# Patient Record
Sex: FEMALE | Race: WHITE | NOT HISPANIC OR LATINO | Employment: FULL TIME
[De-identification: names, ages, dates, MRNs, and addresses within clinical notes are randomized per-mention and may not be internally consistent; named-entity substitution may affect disease eponyms.]

---

## 2017-05-26 ENCOUNTER — HEALTH MAINTENANCE LETTER (OUTPATIENT)
Age: 57
End: 2017-05-26

## 2017-06-02 ENCOUNTER — OFFICE VISIT (OUTPATIENT)
Dept: PEDIATRICS | Facility: CLINIC | Age: 57
End: 2017-06-02
Payer: COMMERCIAL

## 2017-06-02 VITALS
OXYGEN SATURATION: 94 % | DIASTOLIC BLOOD PRESSURE: 68 MMHG | HEIGHT: 67 IN | WEIGHT: 192.3 LBS | BODY MASS INDEX: 30.18 KG/M2 | HEART RATE: 63 BPM | TEMPERATURE: 97.4 F | SYSTOLIC BLOOD PRESSURE: 132 MMHG

## 2017-06-02 DIAGNOSIS — Z12.31 VISIT FOR SCREENING MAMMOGRAM: ICD-10-CM

## 2017-06-02 DIAGNOSIS — N18.30 CKD (CHRONIC KIDNEY DISEASE) STAGE 3, GFR 30-59 ML/MIN (H): ICD-10-CM

## 2017-06-02 DIAGNOSIS — Z12.4 SCREENING FOR CERVICAL CANCER: ICD-10-CM

## 2017-06-02 DIAGNOSIS — Z00.00 ROUTINE GENERAL MEDICAL EXAMINATION AT A HEALTH CARE FACILITY: Primary | ICD-10-CM

## 2017-06-02 DIAGNOSIS — E78.5 HYPERLIPIDEMIA LDL GOAL <130: ICD-10-CM

## 2017-06-02 DIAGNOSIS — I10 ESSENTIAL HYPERTENSION WITH GOAL BLOOD PRESSURE LESS THAN 140/90: ICD-10-CM

## 2017-06-02 DIAGNOSIS — Z23 NEED FOR VACCINATION: ICD-10-CM

## 2017-06-02 DIAGNOSIS — R73.03 PREDIABETES: ICD-10-CM

## 2017-06-02 PROCEDURE — 90471 IMMUNIZATION ADMIN: CPT | Performed by: FAMILY MEDICINE

## 2017-06-02 PROCEDURE — 90715 TDAP VACCINE 7 YRS/> IM: CPT | Performed by: FAMILY MEDICINE

## 2017-06-02 PROCEDURE — 99396 PREV VISIT EST AGE 40-64: CPT | Mod: 25 | Performed by: FAMILY MEDICINE

## 2017-06-02 PROCEDURE — 87624 HPV HI-RISK TYP POOLED RSLT: CPT | Performed by: FAMILY MEDICINE

## 2017-06-02 PROCEDURE — G0145 SCR C/V CYTO,THINLAYER,RESCR: HCPCS | Performed by: FAMILY MEDICINE

## 2017-06-02 RX ORDER — METOPROLOL SUCCINATE 100 MG/1
100 TABLET, EXTENDED RELEASE ORAL DAILY
Qty: 90 TABLET | Refills: 1 | Status: SHIPPED | OUTPATIENT
Start: 2017-06-02 | End: 2018-01-12

## 2017-06-02 RX ORDER — LISINOPRIL 40 MG/1
40 TABLET ORAL DAILY
Qty: 90 TABLET | Refills: 1 | Status: SHIPPED | OUTPATIENT
Start: 2017-06-02 | End: 2018-01-12

## 2017-06-02 ASSESSMENT — PAIN SCALES - GENERAL: PAINLEVEL: NO PAIN (0)

## 2017-06-02 NOTE — MR AVS SNAPSHOT
After Visit Summary   6/2/2017    Jannet Love    MRN: [id number]           Patient Information     Date Of Birth          1960        Visit Information        Provider Department      6/2/2017 2:40 PM Laverne Argueta MD [facility]        Today's Diagnoses     Routine general medical examination at a health care facility    -  1    Essential hypertension with goal blood pressure less than 140/90        Need for vaccination        Screening for cervical cancer        CKD (chronic kidney disease) stage 3, GFR 30-59 ml/min        Visit for screening mammogram          Care Instructions    Get the TDAP shot today  Schedule for fasting labs, mammogram, recheck in 6 months      Preventive Health Recommendations  Female Ages 50 - 64    Yearly exam: See your health care provider every year in order to  o Review health changes.   o Discuss preventive care.    o Review your medicines if your doctor has prescribed any.      Get a Pap test every three years (unless you have an abnormal result and your provider advises testing more often).    If you get Pap tests with HPV test, you only need to test every 5 years, unless you have an abnormal result.     You do not need a Pap test if your uterus was removed (hysterectomy) and you have not had cancer.    You should be tested each year for STDs (sexually transmitted diseases) if you're at risk.     Have a mammogram every 1 to 2 years.    Have a colonoscopy at age 50, or have a yearly FIT test (stool test). These exams screen for colon cancer.      Have a cholesterol test every 5 years, or more often if advised.    Have a diabetes test (fasting glucose) every three years. If you are at risk for diabetes, you should have this test more often.     If you are at risk for osteoporosis (brittle bone disease), think about having a bone density scan (DEXA).    Shots: Get a flu shot each year. Get a tetanus shot every 10 years.    Nutrition:      Eat at least 5 servings of fruits and vegetables each day.    Eat whole-grain bread, whole-wheat pasta and brown rice instead of white grains and rice.    Talk to your provider about Calcium and Vitamin D.     Lifestyle    Exercise at least 150 minutes a week (30 minutes a day, 5 days a week). This will help you control your weight and prevent disease.    Limit alcohol to one drink per day.    No smoking.     Wear sunscreen to prevent skin cancer.     See your dentist every six months for an exam and cleaning.    See your eye doctor every 1 to 2 years.            Follow-ups after your visit        Future tests that were ordered for you today     Open Future Orders        Priority Expected Expires Ordered    MA Screening Digital Bilateral Routine  6/2/2018 6/2/2017            Who to contact     If you have questions or need follow up information about today's clinic visit or your schedule please contact [facility] directly at [phone].  Normal or non-critical lab and imaging results will be communicated to you by [employer], letter or phone within 4 business days after the clinic has received the results. If you do not hear from us within 7 days, please contact the clinic through [employer] or phone. If you have a critical or abnormal lab result, we will notify you by phone as soon as possible.  Submit refill requests through [employer] or call your pharmacy and they will forward the refill request to us. Please allow 3 business days for your refill to be completed.          Additional Information About Your Visit        Envervhart Information     [employer] gives you secure access to your electronic health record. If you see a primary care provider, you can also send messages to your care team and make appointments. If you have questions, please call your primary care clinic.  If you do not have a primary care provider, please call [phone] and they will assist you.      [employer] is an electronic  "gateway that provides easy, online access to your medical records. With [employer]Dep, you can request a clinic appointment, read your test results, renew a prescription or communicate with your care team.     To access your existing account, please contact your [facility] or call [phone] for assistance.        Care EveryWhere ID     This is your Care EveryWhere ID. This could be used by other organizations to access your [place] medical records  [id number]        Your Vitals Were     Pulse Temperature Height Last Period Pulse Oximetry BMI (Body Mass Index)    63 97.4  F (36.3  C) (Oral) 5' 7\" (1.702 m) 08/26/2007 94% 30.12 kg/m2       Blood Pressure from Last 3 Encounters:   06/02/17 132/68   11/30/16 138/78   09/04/15 124/78    Weight from Last 3 Encounters:   06/02/17 192 lb 4.8 oz (87.2 kg)   11/30/16 189 lb (85.7 kg)   09/04/15 177 lb (80.3 kg)              We Performed the Following     1st  Administration  [27493]     HPV High Risk Types DNA Cervical     Pap imaged thin layer screen with HPV - recommended age 30 - 65 years (select HPV order below)     TDAP VACCINE (ADACEL) [24642.002]          Where to get your medicines      Some of these will need a paper prescription and others can be bought over the counter.  Ask your nurse if you have questions.     Bring a paper prescription for each of these medications     lisinopril 40 MG tablet    metoprolol 100 MG 24 hr tablet          Primary Care Provider Office Phone # Fax #    Laverne Argueta [phone]       [facility] [address]  [facility] [address]        Thank you!     Thank you for choosing [facility]  for your care. Our goal is always to provide you with excellent care. Hearing back from our patients is one way we can continue to improve our services. Please take a few minutes to complete the written survey that you may receive in the mail after your visit with us. " Thank you!             Your Updated Medication List - Protect others around you: Learn how to safely use, store and throw away your medicines at www.disposemymeds.org.          This list is accurate as of: 6/2/17  3:14 PM.  Always use your most recent med list.                   Brand Name Dispense Instructions for use    aspirin 81 MG tablet     100    ONE DAILY       Calcium-Vitamin D 600-200 MG-UNIT Tabs      Take 1 tablet by mouth daily       fish oil-omega-3 fatty acids 1000 MG capsule      take 2 g by mouth daily.       lisinopril 40 MG tablet    PRINIVIL/ZESTRIL    90 tablet    Take 1 tablet (40 mg) by mouth daily       metoprolol 100 MG 24 hr tablet    TOPROL-XL    90 tablet    Take 1 tablet (100 mg) by mouth daily       simvastatin 40 MG tablet    ZOCOR    90 tablet    Take 1 tablet (40 mg) by mouth At Bedtime

## 2017-06-02 NOTE — PROGRESS NOTES
SUBJECTIVE:     CC: Jannet Love is an 56 year old woman who presents for preventive health visit.     Physical   Annual:     Getting at least 3 servings of Calcium per day::  Yes    Bi-annual eye exam::  Yes    Dental care twice a year::  Yes    Sleep apnea or symptoms of sleep apnea::  None    Diet::  Low salt and Low fat/cholesterol    Frequency of exercise::  6-7 days/week    Duration of exercise::  30-45 minutes    Taking medications regularly::  Yes    Medication side effects::  None    Additional concerns today::  No        Hyperlipidemia Follow-Up      Rate your low fat/cholesterol diet?: not monitoring fat    Taking statin?  no    Other lipid medications/supplements?:  none     Hypertension Follow-up      Outpatient blood pressures are not being checked.    Low Salt Diet: no added salt       Today's PHQ-2 Score:   PHQ-2 ( 1999 Pfizer) 5/30/2017   Q1: Little interest or pleasure in doing things 0   Q2: Feeling down, depressed or hopeless 0   PHQ-2 Score 0   Q1: Little interest or pleasure in doing things Not at all   Q2: Feeling down, depressed or hopeless Not at all   PHQ-2 Score 0       Abuse: Current or Past(Physical, Sexual or Emotional)- No  Do you feel safe in your environment - Yes    Social History   Substance Use Topics     Smoking status: Never Smoker     Smokeless tobacco: Never Used     Alcohol use Yes      Comment: ocassionally     The patient does not drink >3 drinks per day nor >7 drinks per week.    Recent Labs   Lab Test  12/07/16   0716  09/04/15   0730  04/11/14   0729   CHOL  196  140  166   HDL  46*  44*  35*   LDL  133*  76  91   TRIG  85  102  203*   CHOLHDLRATIO   --   3.2  4.8   NHDL  150*   --    --        Reviewed orders with patient.  Reviewed health maintenance and updated orders accordingly - Yes    Mammo Decision Support:  Patient over age 50, mutual decision to screen reflected in health maintenance.    Pertinent mammograms are reviewed under the imaging tab.  History  of abnormal Pap smear: NO - age 30- 65 PAP every 3 years recommended    Reviewed and updated as needed this visit by clinical staff  Tobacco  Allergies  Meds  Med Hx  Surg Hx  Fam Hx  Soc Hx        Reviewed and updated as needed this visit by Provider          Past Medical History:   Diagnosis Date     Bronchitis, not specified as acute or chronic      EXCESSIVE MENSTRUATION 2007     EXCESSIVE MENSTRUATION 2007    Mirena IUD placed for simple hyperplasia     INSERTION OF IUD 10/3/2007    Mirena IUD  For simple hyperplasia of the endometrium     Mixed hyperlipidemia      NONSPECIFIC MEDICAL HISTORY     cough     Unspecified essential hypertension       Past Surgical History:   Procedure Laterality Date     C NONSPECIFIC PROCEDURE      Hysteroscopy with deep endometrial biopsy. Endocervical curettage.     C NONSPECIFIC PROCEDURE      brachial cleft cyst repair     HEAD & NECK SURGERY       Obstetric History       T2      L2     SAB0   TAB0   Ectopic0   Multiple0   Live Births0       # Outcome Date GA Lbr Scar/2nd Weight Sex Delivery Anes PTL Lv   3 Term 86 40w0d   M    RALPH      Name: Mars   2 SAB 85     SAB   DEC    Term 83 40w0d   F    RALPH      Name: Tonya          ROS:  C: NEGATIVE for fever, chills, change in weight  I: NEGATIVE for worrisome rashes, moles or lesions  E: NEGATIVE for vision changes or irritation  ENT: NEGATIVE for ear, mouth and throat problems  R: NEGATIVE for significant cough or SOB  B: NEGATIVE for masses, tenderness or discharge  CV: NEGATIVE for chest pain, palpitations or peripheral edema  CV: Hx HTN  GI: NEGATIVE for nausea, abdominal pain, heartburn, or change in bowel habits  : NEGATIVE for unusual urinary or vaginal symptoms. No vaginal bleeding.  M: NEGATIVE for significant arthralgias or myalgia  N: NEGATIVE for weakness, dizziness or paresthesias  E: NEGATIVE for temperature intolerance, skin/hair changes  H: NEGATIVE  for bleeding problems  P: NEGATIVE for changes in mood or affect     Problem list, Medication list, Allergies, and Medical/Social/Surgical histories reviewed in [facility] and updated as appropriate.  Labs reviewed in EPIC  BP Readings from Last 3 Encounters:   06/02/17 132/68   11/30/16 138/78   09/04/15 124/78    Wt Readings from Last 3 Encounters:   06/02/17 192 lb 4.8 oz (87.2 kg)   11/30/16 189 lb (85.7 kg)   09/04/15 177 lb (80.3 kg)                  Patient Active Problem List   Diagnosis     Excessive or frequent menstruation     Surveillance of previously prescribed intrauterine contraceptive device     Endometrial hyperplasia     Knee pain     Dyslipidemia     Dermatofibroma of lower extremity     Prediabetes     Hyperlipidemia LDL goal <130     CKD (chronic kidney disease) stage 3, GFR 30-59 ml/min     Essential hypertension with goal blood pressure less than 140/90     Past Surgical History:   Procedure Laterality Date     C NONSPECIFIC PROCEDURE  2000    Hysteroscopy with deep endometrial biopsy. Endocervical curettage.     C NONSPECIFIC PROCEDURE  1984    brachial cleft cyst repair     HEAD & NECK SURGERY         Social History   Substance Use Topics     Smoking status: Never Smoker     Smokeless tobacco: Never Used     Alcohol use Yes      Comment: ocassionally     Family History   Problem Relation Age of Onset     Hypertension Mother      C.A.D. Mother      MI     C.A.D. Father      MI     Cardiovascular Father 49     MI, pacemaker in 2009     HEART DISEASE Father      Prostate Cancer Father      C.A.D. Brother      MI at age 41     Cardiovascular Brother      HEART DISEASE Brother      C.A.D. Maternal Grandfather      DIABETES Maternal Grandmother      GASTROINTESTINAL DISEASE Daughter 11     Crohns Disease     Breast Cancer Paternal Grandmother      Alcohol/Drug Paternal Grandfather          Current Outpatient Prescriptions   Medication Sig Dispense Refill     metoprolol (TOPROL-XL) 100 MG 24 hr tablet  "Take 1 tablet (100 mg) by mouth daily 90 tablet 1     lisinopril (PRINIVIL/ZESTRIL) 40 MG tablet Take 1 tablet (40 mg) by mouth daily 90 tablet 1     Calcium-Vitamin D 600-200 MG-UNIT TABS Take 1 tablet by mouth daily        fish oil-omega-3 fatty acids (FISH OIL) 1000 MG capsule take 2 g by mouth daily.       [DISCONTINUED] metoprolol (TOPROL-XL) 100 MG 24 hr tablet Take 1 tablet (100 mg) by mouth daily 90 tablet 1     [DISCONTINUED] lisinopril (PRINIVIL,ZESTRIL) 40 MG tablet Take 1 tablet (40 mg) by mouth daily 90 tablet 1     simvastatin (ZOCOR) 40 MG tablet Take 1 tablet (40 mg) by mouth At Bedtime (Patient not taking: Reported on 6/2/2017) 90 tablet 1     ASPIRIN 81 MG OR TABS ONE DAILY 100 3     Allergies   Allergen Reactions     No Known Drug Allergies      Recent Labs   Lab Test  12/07/16   0716  12/07/16   0710  09/04/15   0730  10/24/14   0723  04/11/14   0729   A1C  5.6   --   5.6  5.9  6.1*   LDL  133*   --   76   --   91   HDL  46*   --   44*   --   35*   TRIG  85   --   102   --   203*   ALT  26   --   31   --   43   CR  0.92   --   1.01   --    --    GFRESTIMATED  63   --   57*   --    --    GFRESTBLACK  76   --   69   --    --    POTASSIUM  4.4   --   4.1   --    --    TSH   --   2.08   --    --    --       OBJECTIVE:     /68  Pulse 63  Temp 97.4  F (36.3  C) (Oral)  Ht 5' 7\" (1.702 m)  Wt 192 lb 4.8 oz (87.2 kg)  LMP 08/26/2007  SpO2 94%  BMI 30.12 kg/m2  EXAM:  GENERAL APPEARANCE: healthy, alert and no distress  EYES: Eyes grossly normal to inspection, PERRL and conjunctivae and sclerae normal  HENT: ear canals and TM's normal, nose and mouth without ulcers or lesions, oropharynx clear and oral mucous membranes moist  NECK: no adenopathy, no asymmetry, masses, or scars and thyroid normal to palpation  RESP: lungs clear to auscultation - no rales, rhonchi or wheezes  BREAST: normal without masses, tenderness or nipple discharge and no palpable axillary masses or adenopathy  CV: regular " rate and rhythm, normal S1 S2, no S3 or S4, no murmur, click or rub, no peripheral edema and peripheral pulses strong  ABDOMEN: soft, nontender, no hepatosplenomegaly, no masses and bowel sounds normal   (female): normal female external genitalia, normal urethral meatus, vaginal mucosal atrophy noted, normal cervix, adnexae, and uterus without masses or abnormal discharge  MS: no musculoskeletal defects are noted and gait is age appropriate without ataxia  SKIN: no suspicious lesions or rashes  NEURO: Normal strength and tone, sensory exam grossly normal, mentation intact and speech normal  PSYCH: mentation appears normal and affect normal/bright    ASSESSMENT/PLAN:     1. Routine general medical examination at a health care facility  Discussed on regular exercises, daily calcium intake, healthy eating, self breast exams monthly and routine dental checks  - Pap imaged thin layer screen with HPV - recommended age 30 - 65 years (select HPV order below)  - HPV High Risk Types DNA Cervical  - MA Screening Digital Bilateral; Future    2. Essential hypertension with goal blood pressure less than 140/90  Blood pressure is at goal, continue with current medications, recheck in 6 months along with fasting labs  Will follow low salt diet, weight loss and regular exercises.    - metoprolol (TOPROL-XL) 100 MG 24 hr tablet; Take 1 tablet (100 mg) by mouth daily  Dispense: 90 tablet; Refill: 1  - lisinopril (PRINIVIL/ZESTRIL) 40 MG tablet; Take 1 tablet (40 mg) by mouth daily  Dispense: 90 tablet; Refill: 1    3. Need for vaccination    - TDAP VACCINE (ADACEL) [15296.002]  - 1st  Administration  [83136]    4. Screening for cervical cancer    - Pap imaged thin layer screen with HPV - recommended age 30 - 65 years (select HPV order below)  - HPV High Risk Types DNA Cervical    5. CKD (chronic kidney disease) stage 3, GFR 30-59 ml/min  Continue to monitor, avoid over-the-counter NSAIDs, recheck in 6 months    6. Visit for  "screening mammogram    - MA Screening Digital Bilateral; Future    7. Hyperlipidemia LDL goal <130  LDL Cholesterol Calculated   Date Value Ref Range Status   12/07/2016 133 (H) <100 mg/dL Final     Comment:     Above desirable:  100-129 mg/dl   Borderline High:  130-159 mg/dL   High:             160-189 mg/dL   Very high:       >189 mg/dl     ]  Patient is currently off of statin, will recheck at the time of visit in 6 months,  Continue with healthy eating, regular exercise, start on weight loss    8. Prediabetes  as above      COUNSELING:  Reviewed preventive health counseling, as reflected in patient instructions  Special attention given to:        Regular exercise       Healthy diet/nutrition       Vision screening       Immunizations    Vaccinated for: TDAP           Osteoporosis Prevention/Bone Health       Colon cancer screening       Consider Hep C screening for patients born between 1945 and 1965       (Caroline)menopause management       The 10-year ASCVD risk score (Gloria ANGELES Jr, et al., 2013) is: 3.9%    Values used to calculate the score:      Age: 56 years      Sex: Female      Is Non- : No      Diabetic: No      Tobacco smoker: No      Systolic Blood Pressure: 132 mmHg      Is BP treated: Yes      HDL Cholesterol: 46 mg/dL      Total Cholesterol: 196 mg/dL         reports that she has never smoked. She has never used smokeless tobacco.    Estimated body mass index is 30.12 kg/(m^2) as calculated from the following:    Height as of this encounter: 5' 7\" (1.702 m).    Weight as of this encounter: 192 lb 4.8 oz (87.2 kg).   Weight management plan: Discussed healthy diet and exercise guidelines and patient will follow up in 6 months in clinic to re-evaluate.    Counseling Resources:  ATP IV Guidelines  Pooled Cohorts Equation Calculator  Breast Cancer Risk Calculator  FRAX Risk Assessment  ICSI Preventive Guidelines  Dietary Guidelines for Americans, 2010  USDA's MyPlate  ASA " Prophylaxis  Lung CA Screening    Laverne Argueta MD  [facility]  Answers for HPI/ROS submitted by the patient on 5/30/2017   PHQ-2 Score: 0  Chart documentation done in part with Dragon Voice recognition Software. Although reviewed after completion, some word and grammatical error may remain.

## 2017-06-02 NOTE — PATIENT INSTRUCTIONS
Get the TDAP shot today  Schedule for fasting labs, mammogram, recheck in 6 months      Preventive Health Recommendations  Female Ages 50 - 64    Yearly exam: See your health care provider every year in order to  o Review health changes.   o Discuss preventive care.    o Review your medicines if your doctor has prescribed any.      Get a Pap test every three years (unless you have an abnormal result and your provider advises testing more often).    If you get Pap tests with HPV test, you only need to test every 5 years, unless you have an abnormal result.     You do not need a Pap test if your uterus was removed (hysterectomy) and you have not had cancer.    You should be tested each year for STDs (sexually transmitted diseases) if you're at risk.     Have a mammogram every 1 to 2 years.    Have a colonoscopy at age 50, or have a yearly FIT test (stool test). These exams screen for colon cancer.      Have a cholesterol test every 5 years, or more often if advised.    Have a diabetes test (fasting glucose) every three years. If you are at risk for diabetes, you should have this test more often.     If you are at risk for osteoporosis (brittle bone disease), think about having a bone density scan (DEXA).    Shots: Get a flu shot each year. Get a tetanus shot every 10 years.    Nutrition:     Eat at least 5 servings of fruits and vegetables each day.    Eat whole-grain bread, whole-wheat pasta and brown rice instead of white grains and rice.    Talk to your provider about Calcium and Vitamin D.     Lifestyle    Exercise at least 150 minutes a week (30 minutes a day, 5 days a week). This will help you control your weight and prevent disease.    Limit alcohol to one drink per day.    No smoking.     Wear sunscreen to prevent skin cancer.     See your dentist every six months for an exam and cleaning.    See your eye doctor every 1 to 2 years.

## 2017-06-02 NOTE — NURSING NOTE
"Chief Complaint   Patient presents with     Physical       Initial /68  Pulse 63  Temp 97.4  F (36.3  C) (Oral)  Ht 5' 7\" (1.702 m)  Wt 192 lb 4.8 oz (87.2 kg)  LMP 08/26/2007  SpO2 94%  BMI 30.12 kg/m2 Estimated body mass index is 30.12 kg/(m^2) as calculated from the following:    Height as of this encounter: 5' 7\" (1.702 m).    Weight as of this encounter: 192 lb 4.8 oz (87.2 kg).  BP completed using cuff size: ashwini Lopez, CMA    "

## 2017-06-06 LAB
COPATH REPORT: NORMAL
PAP: NORMAL

## 2017-06-08 LAB
FINAL DIAGNOSIS: NORMAL
HPV HR 12 DNA CVX QL NAA+PROBE: NEGATIVE
HPV16 DNA SPEC QL NAA+PROBE: NEGATIVE
HPV18 DNA SPEC QL NAA+PROBE: NEGATIVE
SPECIMEN DESCRIPTION: NORMAL

## 2017-12-22 ENCOUNTER — DOCUMENTATION ONLY (OUTPATIENT)
Dept: LAB | Facility: CLINIC | Age: 57
End: 2017-12-22

## 2017-12-22 DIAGNOSIS — E78.5 HYPERLIPIDEMIA LDL GOAL <130: Primary | ICD-10-CM

## 2017-12-22 DIAGNOSIS — I10 ESSENTIAL HYPERTENSION WITH GOAL BLOOD PRESSURE LESS THAN 140/90: ICD-10-CM

## 2017-12-22 NOTE — PROGRESS NOTES
Labs pended. Routed to PCP to review and order.    Marjan Luong RN,   [facility]

## 2017-12-27 ENCOUNTER — RADIANT APPOINTMENT (OUTPATIENT)
Dept: MAMMOGRAPHY | Facility: CLINIC | Age: 57
End: 2017-12-27
Attending: FAMILY MEDICINE
Payer: COMMERCIAL

## 2017-12-27 DIAGNOSIS — I10 ESSENTIAL HYPERTENSION WITH GOAL BLOOD PRESSURE LESS THAN 140/90: ICD-10-CM

## 2017-12-27 DIAGNOSIS — E78.5 HYPERLIPIDEMIA LDL GOAL <130: ICD-10-CM

## 2017-12-27 DIAGNOSIS — Z12.31 VISIT FOR SCREENING MAMMOGRAM: ICD-10-CM

## 2017-12-27 DIAGNOSIS — Z00.00 ROUTINE GENERAL MEDICAL EXAMINATION AT A HEALTH CARE FACILITY: ICD-10-CM

## 2017-12-27 LAB
ANION GAP SERPL CALCULATED.3IONS-SCNC: 6 MMOL/L (ref 3–14)
BUN SERPL-MCNC: 17 MG/DL (ref 7–30)
CALCIUM SERPL-MCNC: 8.9 MG/DL (ref 8.5–10.1)
CHLORIDE SERPL-SCNC: 106 MMOL/L (ref 94–109)
CHOLEST SERPL-MCNC: 257 MG/DL
CO2 SERPL-SCNC: 27 MMOL/L (ref 20–32)
CREAT SERPL-MCNC: 0.81 MG/DL (ref 0.52–1.04)
CREAT UR-MCNC: 102 MG/DL
GFR SERPL CREATININE-BSD FRML MDRD: 72 ML/MIN/1.7M2
GLUCOSE SERPL-MCNC: 103 MG/DL (ref 70–99)
HDLC SERPL-MCNC: 47 MG/DL
LDLC SERPL CALC-MCNC: 164 MG/DL
MICROALBUMIN UR-MCNC: 72 MG/L
MICROALBUMIN/CREAT UR: 70.98 MG/G CR (ref 0–25)
NONHDLC SERPL-MCNC: 210 MG/DL
POTASSIUM SERPL-SCNC: 4 MMOL/L (ref 3.4–5.3)
SODIUM SERPL-SCNC: 139 MMOL/L (ref 133–144)
TRIGL SERPL-MCNC: 231 MG/DL

## 2017-12-27 PROCEDURE — 82043 UR ALBUMIN QUANTITATIVE: CPT | Performed by: FAMILY MEDICINE

## 2017-12-27 PROCEDURE — 80061 LIPID PANEL: CPT | Performed by: FAMILY MEDICINE

## 2017-12-27 PROCEDURE — 80048 BASIC METABOLIC PNL TOTAL CA: CPT | Performed by: FAMILY MEDICINE

## 2017-12-27 PROCEDURE — G0202 SCR MAMMO BI INCL CAD: HCPCS | Performed by: STUDENT IN AN ORGANIZED HEALTH CARE EDUCATION/TRAINING PROGRAM

## 2017-12-27 PROCEDURE — 36415 COLL VENOUS BLD VENIPUNCTURE: CPT | Performed by: FAMILY MEDICINE

## 2017-12-30 NOTE — PROGRESS NOTES
Dear Jannet,   Dr. Argueta is out of the office. I'm review your results on her behalf.     --Lipid panel is much higher than a year ago.  Glucose is in the prediabetes range.  Urine now has protein.  --Please review these results with Dr. Argueta at your appointment on January 12, 2018    Please call or Mychart to our office if you have further questions.     Ze Constantino MD-PhD

## 2018-01-12 ENCOUNTER — OFFICE VISIT (OUTPATIENT)
Dept: PEDIATRICS | Facility: CLINIC | Age: 58
End: 2018-01-12
Payer: COMMERCIAL

## 2018-01-12 VITALS
DIASTOLIC BLOOD PRESSURE: 86 MMHG | OXYGEN SATURATION: 98 % | SYSTOLIC BLOOD PRESSURE: 186 MMHG | TEMPERATURE: 98.3 F | HEIGHT: 67 IN | WEIGHT: 203.3 LBS | BODY MASS INDEX: 31.91 KG/M2 | HEART RATE: 92 BPM

## 2018-01-12 DIAGNOSIS — E66.9 OBESITY (BMI 30-39.9): ICD-10-CM

## 2018-01-12 DIAGNOSIS — R73.03 PREDIABETES: ICD-10-CM

## 2018-01-12 DIAGNOSIS — I10 ESSENTIAL HYPERTENSION WITH GOAL BLOOD PRESSURE LESS THAN 140/90: Primary | ICD-10-CM

## 2018-01-12 DIAGNOSIS — E78.5 HYPERLIPIDEMIA LDL GOAL <100: ICD-10-CM

## 2018-01-12 PROCEDURE — 99214 OFFICE O/P EST MOD 30 MIN: CPT | Performed by: FAMILY MEDICINE

## 2018-01-12 RX ORDER — SIMVASTATIN 40 MG
40 TABLET ORAL AT BEDTIME
Qty: 90 TABLET | Refills: 2 | Status: SHIPPED | OUTPATIENT
Start: 2018-01-12 | End: 2018-05-11

## 2018-01-12 RX ORDER — METOPROLOL SUCCINATE 100 MG/1
100 TABLET, EXTENDED RELEASE ORAL DAILY
Qty: 90 TABLET | Refills: 2 | Status: SHIPPED | OUTPATIENT
Start: 2018-01-12 | End: 2018-09-14

## 2018-01-12 RX ORDER — LISINOPRIL 40 MG/1
40 TABLET ORAL DAILY
Qty: 90 TABLET | Refills: 2 | Status: SHIPPED | OUTPATIENT
Start: 2018-01-12 | End: 2018-09-14

## 2018-01-12 NOTE — NURSING NOTE
"Chief Complaint   Patient presents with     Recheck Medication       Initial /86  Pulse 92  Temp 98.3  F (36.8  C) (Temporal)  Ht 5' 7.25\" (1.708 m)  Wt 203 lb 4.8 oz (92.2 kg)  LMP 08/26/2007  SpO2 98%  BMI 31.6 kg/m2 Estimated body mass index is 31.6 kg/(m^2) as calculated from the following:    Height as of this encounter: 5' 7.25\" (1.708 m).    Weight as of this encounter: 203 lb 4.8 oz (92.2 kg).  Medication Reconciliation: complete     Radha Birmingham CMA  "

## 2018-01-12 NOTE — MR AVS SNAPSHOT
After Visit Summary   1/12/2018    Jannet Love    MRN: [id number]           Patient Information     Date Of Birth          1960        Visit Information        Provider Department      1/12/2018 11:10 AM Laverne Argueta MD [facility]        Today's Diagnoses     Hyperlipidemia LDL goal <100        Essential hypertension with goal blood pressure less than 140/90          Care Instructions    Schedule for fasting labs and physical in 6/2018  Please make sure to take medications daily          Follow-ups after your visit        Your next 10 appointments already scheduled     Jun 08, 2018  8:00 AM CDT   LAB with LAB FIRST FLOOR [facility] ([facility])    [address] [place] [id number] [phone]           Please do not eat 10-12 hours before your appointment if you are coming in fasting for labs on lipids, cholesterol, or glucose (sugar). This does not apply to pregnant women. Water, hot tea and black coffee (with nothing added) are okay. Do not drink other fluids, diet soda or chew gum.            Jun 08, 2018  1:30 PM CDT   PHYSICAL with Laverne Argueta MD   [facility] ([facility])    [address] [place] [id number] [phone]              Who to contact     If you have questions or need follow up information about today's clinic visit or your schedule please contact [facility] directly at [phone].  Normal or non-critical lab and imaging results will be communicated to you by MyChart, letter or phone within 4 business days after the clinic has received the results. If you do not hear from us within 7 days, please contact the clinic through MyChart or phone. If you have a critical or abnormal lab result, we will notify you by phone as soon as possible.  Submit refill requests through [employer] or call your pharmacy and they will  "forward the refill request to us. Please allow 3 business days for your refill to be completed.          Additional Information About Your Visit        [employer] Information     [employer] gives you secure access to your electronic health record. If you see a primary care provider, you can also send messages to your care team and make appointments. If you have questions, please call your primary care clinic.  If you do not have a primary care provider, please call [phone] and they will assist you.      [employer] is an electronic gateway that provides easy, online access to your medical records. With [employer], you can request a clinic appointment, read your test results, renew a prescription or communicate with your care team.     To access your existing account, please contact your [facility] or call [phone] for assistance.        Care EveryWhere ID     This is your Care EveryWhere ID. This could be used by other organizations to access your [facility] medical records  [id number]        Your Vitals Were     Pulse Temperature Height Last Period Pulse Oximetry BMI (Body Mass Index)    92 98.3  F (36.8  C) (Temporal) 5' 7.25\" (1.708 m) 08/26/2007 98% 31.6 kg/m2       Blood Pressure from Last 3 Encounters:   01/12/18 186/86   06/02/17 132/68   11/30/16 138/78    Weight from Last 3 Encounters:   01/12/18 203 lb 4.8 oz (92.2 kg)   06/02/17 192 lb 4.8 oz (87.2 kg)   11/30/16 189 lb (85.7 kg)              Today, you had the following     No orders found for display         Where to get your medicines      These medications were sent to [facility] - [address], [facility] [address]     Phone:  [phone]     lisinopril 40 MG tablet    metoprolol succinate 100 MG 24 hr tablet    simvastatin 40 MG tablet          Primary Care Provider Office Phone # Fax #    Laverne Argueta [phone] [id number] [place]  [facility] [address]        Equal Access to Services     RAFAELA MYERS : Hadii tasneem cruz haddaylinwing Sodivyaali, waaxda luqadaha, qaybta kaareliantonio melo, ingrid bynumbensonstone klein . So [facility] [phone].    ATENCIÓN: Si habla español, tiene a herrera disposición servicios gratuitos de asistencia lingüística. Llame al [phone].    We comply with applicable federal civil rights laws and [place] laws. We do not discriminate on the basis of race, color, national origin, age, disability, sex, sexual orientation, or gender identity.            Thank you!     Thank you for choosing [facility]  for your care. Our goal is always to provide you with excellent care. Hearing back from our patients is one way we can continue to improve our services. Please take a few minutes to complete the written survey that you may receive in the mail after your visit with us. Thank you!             Your Updated Medication List - Protect others around you: Learn how to safely use, store and throw away your medicines at www.disposemymeds.org.          This list is accurate as of: 1/12/18 11:54 AM.  Always use your most recent med list.                   Brand Name Dispense Instructions for use Diagnosis    aspirin 81 MG tablet     100    ONE DAILY        Calcium-Vitamin D 600-200 MG-UNIT Tabs      Take 1 tablet by mouth daily        fish oil-omega-3 fatty acids 1000 MG capsule      take 2 g by mouth daily.        lisinopril 40 MG tablet    PRINIVIL/ZESTRIL    90 tablet    Take 1 tablet (40 mg) by mouth daily    Essential hypertension with goal blood pressure less than 140/90       metoprolol succinate 100 MG 24 hr tablet    TOPROL-XL    90 tablet    Take 1 tablet (100 mg) by mouth daily    Essential hypertension with goal blood pressure less than 140/90       simvastatin 40 MG tablet    ZOCOR    90 tablet    Take 1 tablet (40 mg) by mouth At Bedtime    Hyperlipidemia LDL goal <100

## 2018-01-12 NOTE — PROGRESS NOTES
SUBJECTIVE:   Jannet Love is a 57 year old female who presents to clinic today for the following health issues:      Hyperlipidemia Follow-Up      Rate your low fat/cholesterol diet?: fair    Taking statin?  Stopped due to insurance/Moving to [place]    Other lipid medications/supplements?:  none    Hypertension Follow-up      Outpatient blood pressures are not being checked.    Low Salt Diet: no added salt          Amount of exercise or physical activity: 4-5 days/week for an average of 30-45 minutes    Problems taking medications regularly: Patient not taking medications since 08/2017 due to insurance and move to [place].    Medication side effects: not applicable  Diet: regular (no restrictions)     PREDIABETES- Deneis polyuria, polydipsia, and polyphagia.          Problem list and histories reviewed & adjusted, as indicated.  Additional history: as documented    Patient Active Problem List   Diagnosis     Excessive or frequent menstruation     Surveillance of previously prescribed intrauterine contraceptive device     Endometrial hyperplasia     Knee pain     Dyslipidemia     Dermatofibroma of lower extremity     Prediabetes     Hyperlipidemia LDL goal <130     CKD (chronic kidney disease) stage 3, GFR 30-59 ml/min     Essential hypertension with goal blood pressure less than 140/90     Obesity (BMI 30-39.9)     Past Surgical History:   Procedure Laterality Date     C NONSPECIFIC PROCEDURE  2000    Hysteroscopy with deep endometrial biopsy. Endocervical curettage.     C NONSPECIFIC PROCEDURE  1984    brachial cleft cyst repair     HEAD & NECK SURGERY         Social History   Substance Use Topics     Smoking status: Never Smoker     Smokeless tobacco: Never Used     Alcohol use Yes      Comment: ocassionally     Family History   Problem Relation Age of Onset     Hypertension Mother      C.A.D. Mother      MI     C.A.D. Father      MI     Cardiovascular Father 49     MI, pacemaker in 2009     HEART DISEASE  Father      Prostate Cancer Father      HOMEAFLORI. Brother      MI at age 41     Cardiovascular Brother      HEART DISEASE Brother      ANAIS.A.JRESON. Maternal Grandfather      DIABETES Maternal Grandmother      GASTROINTESTINAL DISEASE Daughter 11     Crohns Disease     Breast Cancer Paternal Grandmother      Alcohol/Drug Paternal Grandfather          Current Outpatient Prescriptions   Medication Sig Dispense Refill     simvastatin (ZOCOR) 40 MG tablet Take 1 tablet (40 mg) by mouth At Bedtime 90 tablet 2     lisinopril (PRINIVIL/ZESTRIL) 40 MG tablet Take 1 tablet (40 mg) by mouth daily 90 tablet 2     metoprolol succinate (TOPROL-XL) 100 MG 24 hr tablet Take 1 tablet (100 mg) by mouth daily 90 tablet 2     [DISCONTINUED] metoprolol (TOPROL-XL) 100 MG 24 hr tablet Take 1 tablet (100 mg) by mouth daily (Patient not taking: Reported on 1/12/2018) 90 tablet 1     [DISCONTINUED] lisinopril (PRINIVIL/ZESTRIL) 40 MG tablet Take 1 tablet (40 mg) by mouth daily (Patient not taking: Reported on 1/12/2018) 90 tablet 1     [DISCONTINUED] simvastatin (ZOCOR) 40 MG tablet Take 1 tablet (40 mg) by mouth At Bedtime (Patient not taking: Reported on 6/2/2017) 90 tablet 1     Calcium-Vitamin D 600-200 MG-UNIT TABS Take 1 tablet by mouth daily        fish oil-omega-3 fatty acids (FISH OIL) 1000 MG capsule take 2 g by mouth daily.       ASPIRIN 81 MG OR TABS ONE DAILY 100 3     Allergies   Allergen Reactions     No Known Drug Allergies      Recent Labs   Lab Test  12/27/17   0739  12/07/16   0716  12/07/16   0710  09/04/15   0730  10/24/14   0723  04/11/14   0729   A1C   --   5.6   --   5.6  5.9  6.1*   LDL  164*  133*   --   76   --   91   HDL  47*  46*   --   44*   --   35*   TRIG  231*  85   --   102   --   203*   ALT   --   26   --   31   --   43   CR  0.81  0.92   --   1.01   --    --    GFRESTIMATED  72  63   --   57*   --    --    GFRESTBLACK  87  76   --   69   --    --    POTASSIUM  4.0  4.4   --   4.1   --    --    TSH   --    --    "2.08   --    --    --       BP Readings from Last 3 Encounters:   01/12/18 186/86   06/02/17 132/68   11/30/16 138/78    Wt Readings from Last 3 Encounters:   01/12/18 203 lb 4.8 oz (92.2 kg)   06/02/17 192 lb 4.8 oz (87.2 kg)   11/30/16 189 lb (85.7 kg)                  Labs reviewed in EPIC          Reviewed and updated as needed this visit by clinical staffTobacco  Allergies  Med Hx  Surg Hx  Fam Hx  Soc Hx      Reviewed and updated as needed this visit by Provider         ROS:  C: NEGATIVE for fever, chills, change in weight  INTEGUMENTARY/SKIN: NEGATIVE for worrisome rashes, moles or lesions  EYES: NEGATIVE for vision changes or irritation  R: NEGATIVE for significant cough or SOB  CV: NEGATIVE for chest pain, palpitations or peripheral edema  CV: Hx HTN  GI: NEGATIVE for nausea, abdominal pain, heartburn, or change in bowel habits  MUSCULOSKELETAL: NEGATIVE for significant arthralgias or myalgia  NEURO: NEGATIVE for weakness, dizziness or paresthesias  ENDOCRINE: NEGATIVE for temperature intolerance, skin/hair changes  HEME/ALLERGY/IMMUNE: NEGATIVE for bleeding problems  PSYCHIATRIC: NEGATIVE for changes in mood or affect    OBJECTIVE:     /86  Pulse 92  Temp 98.3  F (36.8  C) (Temporal)  Ht 5' 7.25\" (1.708 m)  Wt 203 lb 4.8 oz (92.2 kg)  LMP 08/26/2007  SpO2 98%  BMI 31.6 kg/m2  Body mass index is 31.6 kg/(m^2).  GENERAL: healthy, alert and no distress  NECK: no adenopathy, no asymmetry, masses, or scars and thyroid normal to palpation  RESP: lungs clear to auscultation - no rales, rhonchi or wheezes  CV: regular rate and rhythm, normal S1 S2, no S3 or S4, no murmur, click or rub, no peripheral edema and peripheral pulses strong  MS: no gross musculoskeletal defects noted, no edema  NEURO: Normal strength and tone, mentation intact and speech normal  PSYCH: mentation appears normal, affect normal/bright    Diagnostic Test Results:  none     ASSESSMENT/PLAN:     Hyperlipidemia; slightly " "worsened   Plan:  As mentioned below.    Hypertension; much worse   Associated with the following complications:    None   Plan:  As mentioned below.          BMI:   Estimated body mass index is 31.6 kg/(m^2) as calculated from the following:    Height as of this encounter: 5' 7.25\" (1.708 m).    Weight as of this encounter: 203 lb 4.8 oz (92.2 kg).   Weight management plan: Discussed healthy diet and exercise guidelines and patient will follow up in 6 months in clinic to re-evaluate.        1. Essential hypertension with goal blood pressure less than 140/90  BP Readings from Last 6 Encounters:   01/12/18 186/86   06/02/17 132/68   11/30/16 138/78   09/04/15 124/78   03/13/15 127/77   10/28/14 156/81       Blood pressure is not at goal.  Patient is off of both the medications for the past 2-3 months, forgetting to take since she is too busy taking care her granddaughter.  Emphasized on medication compliance for better blood pressure control and to avoid complications from uncontrolled hypertension.    Recommended to start back on lisinopril and metoprolol as before, have a blood pressure recheck with the staff in 2-3 weeks.  Patient [facility] to the [facility] only on Saturdays, wants to have blood pressure recheck herself at home   she will contact provider through my chart in 2-3 weeks on home blood pressure numbers.  Will follow low salt diet, weight loss and regular exercises.  If blood pressure is at goal, will follow-up for physical in June 2018.  Patient verbalised understanding and is agreeable to the plan.  Reviewed normal BMP lab results except for elevated fasting blood sugar.        - lisinopril (PRINIVIL/ZESTRIL) 40 MG tablet; Take 1 tablet (40 mg) by mouth daily  Dispense: 90 tablet; Refill: 2  - metoprolol succinate (TOPROL-XL) 100 MG 24 hr tablet; Take 1 tablet (100 mg) by mouth daily  Dispense: 90 tablet; Refill: 2    2. Hyperlipidemia LDL goal <100  Lab Results   Component Value Date    CHOL 257 12/27/2017 "     Lab Results   Component Value Date    HDL 47 12/27/2017     Lab Results   Component Value Date     12/27/2017     Lab Results   Component Value Date    TRIG 231 12/27/2017     Lab Results   Component Value Date    CHOLHDLRATIO 3.2 09/04/2015       Reviewed moderately elevated fasting lipid numbers, patient is off of the statin   recommended to restart statin 40 mg daily,   recheck lipids at her physical visit in 5 months..  Reviewed healthy eating and regular exercises.    - simvastatin (ZOCOR) 40 MG tablet; Take 1 tablet (40 mg) by mouth At Bedtime  Dispense: 90 tablet; Refill: 2    3. Obesity (BMI 30-39.9)  Emphasized on weight loss, portion control, low calorie and low fat diet, healthy eating, regular exercises.      4. Prediabetes  Glucose   Date Value Ref Range Status   12/27/2017 103 (H) 70 - 99 mg/dL Final     Comment:     Fasting specimen   ]    Emphasized on weight loss, healthy eating, regular exercises, recheck labs at her physical visit in June.       Work on weight loss  Regular exercise  Chart documentation done in part with Dragon Voice recognition Software. Although reviewed after completion, some word and grammatical error may remain.    See Patient Instructions    Laverne Argueta MD  [facility]

## 2018-04-20 ENCOUNTER — MYC MEDICAL ADVICE (OUTPATIENT)
Dept: PEDIATRICS | Facility: CLINIC | Age: 58
End: 2018-04-20

## 2018-04-20 DIAGNOSIS — E78.5 HYPERLIPIDEMIA LDL GOAL <130: ICD-10-CM

## 2018-04-20 DIAGNOSIS — I10 ESSENTIAL HYPERTENSION WITH GOAL BLOOD PRESSURE LESS THAN 140/90: Primary | ICD-10-CM

## 2018-04-20 DIAGNOSIS — N18.30 CKD (CHRONIC KIDNEY DISEASE) STAGE 3, GFR 30-59 ML/MIN (H): ICD-10-CM

## 2018-04-20 DIAGNOSIS — E78.5 DYSLIPIDEMIA: ICD-10-CM

## 2018-04-20 RX ORDER — HYDROCHLOROTHIAZIDE 25 MG/1
25 TABLET ORAL DAILY
Qty: 90 TABLET | Refills: 0 | Status: SHIPPED | OUTPATIENT
Start: 2018-04-20 | End: 2018-05-11 | Stop reason: SINTOL

## 2018-04-20 NOTE — TELEPHONE ENCOUNTER
Patient sent message back via [facility], she will start the medication on 4/30/18 and recheck on 5/11/18    Medication teed up for 90 days since it is a mail order pharmacy.    Routing to provider to review message and medication.      Jolly Mcdonald RN, [facility]

## 2018-05-11 ENCOUNTER — OFFICE VISIT (OUTPATIENT)
Dept: PEDIATRICS | Facility: CLINIC | Age: 58
End: 2018-05-11
Payer: COMMERCIAL

## 2018-05-11 VITALS
WEIGHT: 191 LBS | OXYGEN SATURATION: 100 % | DIASTOLIC BLOOD PRESSURE: 82 MMHG | HEART RATE: 61 BPM | SYSTOLIC BLOOD PRESSURE: 124 MMHG | TEMPERATURE: 96.8 F | BODY MASS INDEX: 29.69 KG/M2

## 2018-05-11 DIAGNOSIS — E78.5 DYSLIPIDEMIA: ICD-10-CM

## 2018-05-11 DIAGNOSIS — N18.30 BENIGN HYPERTENSION WITH CKD (CHRONIC KIDNEY DISEASE) STAGE III (H): Primary | ICD-10-CM

## 2018-05-11 DIAGNOSIS — I10 ESSENTIAL HYPERTENSION WITH GOAL BLOOD PRESSURE LESS THAN 140/90: ICD-10-CM

## 2018-05-11 DIAGNOSIS — N18.30 CKD (CHRONIC KIDNEY DISEASE) STAGE 3, GFR 30-59 ML/MIN (H): ICD-10-CM

## 2018-05-11 DIAGNOSIS — E78.5 HYPERLIPIDEMIA LDL GOAL <130: ICD-10-CM

## 2018-05-11 DIAGNOSIS — I12.9 BENIGN HYPERTENSION WITH CKD (CHRONIC KIDNEY DISEASE) STAGE III (H): Primary | ICD-10-CM

## 2018-05-11 LAB
ANION GAP SERPL CALCULATED.3IONS-SCNC: 6 MMOL/L (ref 3–14)
BUN SERPL-MCNC: 42 MG/DL (ref 7–30)
CALCIUM SERPL-MCNC: 9.4 MG/DL (ref 8.5–10.1)
CHLORIDE SERPL-SCNC: 105 MMOL/L (ref 94–109)
CHOLEST SERPL-MCNC: 140 MG/DL
CO2 SERPL-SCNC: 29 MMOL/L (ref 20–32)
CREAT SERPL-MCNC: 1.27 MG/DL (ref 0.52–1.04)
GFR SERPL CREATININE-BSD FRML MDRD: 43 ML/MIN/1.7M2
GLUCOSE SERPL-MCNC: 111 MG/DL (ref 70–99)
HDLC SERPL-MCNC: 45 MG/DL
LDLC SERPL CALC-MCNC: 76 MG/DL
NONHDLC SERPL-MCNC: 95 MG/DL
POTASSIUM SERPL-SCNC: 4.2 MMOL/L (ref 3.4–5.3)
SODIUM SERPL-SCNC: 140 MMOL/L (ref 133–144)
TRIGL SERPL-MCNC: 97 MG/DL

## 2018-05-11 PROCEDURE — 80048 BASIC METABOLIC PNL TOTAL CA: CPT | Performed by: FAMILY MEDICINE

## 2018-05-11 PROCEDURE — 36415 COLL VENOUS BLD VENIPUNCTURE: CPT | Performed by: FAMILY MEDICINE

## 2018-05-11 PROCEDURE — 99214 OFFICE O/P EST MOD 30 MIN: CPT | Performed by: FAMILY MEDICINE

## 2018-05-11 PROCEDURE — 80061 LIPID PANEL: CPT | Performed by: FAMILY MEDICINE

## 2018-05-11 RX ORDER — AMLODIPINE BESYLATE 10 MG/1
10 TABLET ORAL DAILY
Qty: 90 TABLET | Refills: 1 | Status: SHIPPED | OUTPATIENT
Start: 2018-05-11 | End: 2018-09-14

## 2018-05-11 RX ORDER — PRAVASTATIN SODIUM 40 MG
40 TABLET ORAL DAILY
Qty: 90 TABLET | Refills: 1 | Status: SHIPPED | OUTPATIENT
Start: 2018-05-11 | End: 2018-09-14

## 2018-05-11 NOTE — PATIENT INSTRUCTIONS
Stop HCTZ  Start on AMLODIPINE 10mg at bedtime  Stop Zocor and start on PRAVASTATIN 40 mg at bedtime.  Schedule for non fasting labs and physical in 8/2018

## 2018-05-11 NOTE — PROGRESS NOTES
SUBJECTIVE:   Jannet Love is a 57 year old female who presents to clinic today for the following health issues:      Hypertension Follow-up  Patient is here for hypertension recheck after having hydrochlorothiazide 25 mg added to her current regimen on metoprolol and lisinopril secondary to worsening blood pressure numbers.   Denies chest pain, SOB, edema legs, visual concerns, focal neurological symptoms, dizziness, syncope, palpitations.    Outpatient blood pressures are being checked at home.  Results are 170/90, 150/89.    Low Salt Diet: no added salt      Amount of exercise or physical activity: 6-7 days/week for an average of 30-45 minutes    Problems taking medications regularly: No    Medication side effects: Dry cough on and off    Diet: low salt        Hyperlipidemia Follow-Up      Rate your low fat/cholesterol diet?: good    Taking statin?  Yes, no muscle aches from statin    Other lipid medications/supplements?:  none    Chronic Kidney Disease Follow-up      Current NSAID use?  No      Problem list and histories reviewed & adjusted, as indicated.  Additional history: as documented    Patient Active Problem List   Diagnosis     Excessive or frequent menstruation     Surveillance of previously prescribed intrauterine contraceptive device     Endometrial hyperplasia     Dyslipidemia     Dermatofibroma of lower extremity     Prediabetes     Hyperlipidemia LDL goal <130     CKD (chronic kidney disease) stage 3, GFR 30-59 ml/min     Essential hypertension with goal blood pressure less than 140/90     Obesity (BMI 30-39.9)     Past Surgical History:   Procedure Laterality Date     C NONSPECIFIC PROCEDURE  2000    Hysteroscopy with deep endometrial biopsy. Endocervical curettage.     C NONSPECIFIC PROCEDURE  1984    brachial cleft cyst repair     HEAD & NECK SURGERY         Social History   Substance Use Topics     Smoking status: Never Smoker     Smokeless tobacco: Never Used     Alcohol use Yes       Comment: ocassionally     Family History   Problem Relation Age of Onset     Hypertension Mother      C.A.D. Mother      MI     C.A.D. Father      MI     Cardiovascular Father 49     MI, pacemaker in 2009     HEART DISEASE Father      Prostate Cancer Father      C.A.D. Brother      MI at age 41     Cardiovascular Brother      HEART DISEASE Brother      C.A.D. Maternal Grandfather      DIABETES Maternal Grandmother      GASTROINTESTINAL DISEASE Daughter 11     Crohns Disease     Breast Cancer Paternal Grandmother      Alcohol/Drug Paternal Grandfather          Current Outpatient Prescriptions   Medication Sig Dispense Refill     amLODIPine (NORVASC) 10 MG tablet Take 1 tablet (10 mg) by mouth daily 90 tablet 1     ASPIRIN 81 MG OR TABS ONE DAILY 100 3     Calcium-Vitamin D 600-200 MG-UNIT TABS Take 1 tablet by mouth daily        fish oil-omega-3 fatty acids (FISH OIL) 1000 MG capsule take 2 g by mouth daily.       lisinopril (PRINIVIL/ZESTRIL) 40 MG tablet Take 1 tablet (40 mg) by mouth daily 90 tablet 2     metoprolol succinate (TOPROL-XL) 100 MG 24 hr tablet Take 1 tablet (100 mg) by mouth daily 90 tablet 2     pravastatin (PRAVACHOL) 40 MG tablet Take 1 tablet (40 mg) by mouth daily 90 tablet 1     Allergies   Allergen Reactions     No Known Drug Allergies      Recent Labs   Lab Test  05/11/18   0925  12/27/17   0739  12/07/16   0716  12/07/16   0710  09/04/15   0730  10/24/14   0723  04/11/14   0729   A1C   --    --   5.6   --   5.6  5.9  6.1*   LDL  76  164*  133*   --   76   --   91   HDL  45*  47*  46*   --   44*   --   35*   TRIG  97  231*  85   --   102   --   203*   ALT   --    --   26   --   31   --   43   CR  1.27*  0.81  0.92   --   1.01   --    --    GFRESTIMATED  43*  72  63   --   57*   --    --    GFRESTBLACK  52*  87  76   --   69   --    --    POTASSIUM  4.2  4.0  4.4   --   4.1   --    --    TSH   --    --    --   2.08   --    --    --       BP Readings from Last 3 Encounters:   05/11/18 124/82    01/12/18 186/86   06/02/17 132/68    Wt Readings from Last 3 Encounters:   05/11/18 191 lb (86.6 kg)   01/12/18 203 lb 4.8 oz (92.2 kg)   06/02/17 192 lb 4.8 oz (87.2 kg)                  Labs reviewed in EPIC    Reviewed and updated as needed this visit by clinical staff       Reviewed and updated as needed this visit by Provider         ROS:  CONSTITUTIONAL: NEGATIVE for fever, chills, change in weight  INTEGUMENTARY/SKIN: NEGATIVE for worrisome rashes, moles or lesions  EYES: NEGATIVE for vision changes or irritation  RESP: NEGATIVE for significant cough or SOB  CV: NEGATIVE for chest pain, palpitations or peripheral edema  CV: Hx HTN  GI: NEGATIVE for nausea, abdominal pain, heartburn, or change in bowel habits  MUSCULOSKELETAL: NEGATIVE for significant arthralgias or myalgia  NEURO: NEGATIVE for weakness, dizziness or paresthesias  ENDOCRINE: NEGATIVE for temperature intolerance, skin/hair changes  HEME/ALLERGY/IMMUNE: NEGATIVE for bleeding problems  PSYCHIATRIC: NEGATIVE for changes in mood or affect    OBJECTIVE:     /82  Pulse 61  Temp 96.8  F (36  C) (Temporal)  Wt 191 lb (86.6 kg)  LMP 08/26/2007  SpO2 100%  BMI 29.69 kg/m2  Body mass index is 29.69 kg/(m^2).  GENERAL: healthy, alert and no distress  RESP: lungs clear to auscultation - no rales, rhonchi or wheezes  CV: regular rate and rhythm, normal S1 S2, no S3 or S4, no murmur, click or rub, no peripheral edema and peripheral pulses strong  MS: no gross musculoskeletal defects noted, no edema  SKIN: no suspicious lesions or rashes  NEURO: Normal strength and tone, mentation intact and speech normal  PSYCH: mentation appears normal, affect normal/bright    Diagnostic Test Results:  Results for orders placed or performed in visit on 05/11/18 (from the past 24 hour(s))   **Basic metabolic panel FUTURE anytime   Result Value Ref Range    Sodium 140 133 - 144 mmol/L    Potassium 4.2 3.4 - 5.3 mmol/L    Chloride 105 94 - 109 mmol/L    Carbon  "Dioxide 29 20 - 32 mmol/L    Anion Gap 6 3 - 14 mmol/L    Glucose 111 (H) 70 - 99 mg/dL    Urea Nitrogen 42 (H) 7 - 30 mg/dL    Creatinine 1.27 (H) 0.52 - 1.04 mg/dL    GFR Estimate 43 (L) >60 mL/min/1.7m2    GFR Estimate If Black 52 (L) >60 mL/min/1.7m2    Calcium 9.4 8.5 - 10.1 mg/dL   Lipid panel reflex to direct LDL Fasting   Result Value Ref Range    Cholesterol 140 <200 mg/dL    Triglycerides 97 <150 mg/dL    HDL Cholesterol 45 (L) >49 mg/dL    LDL Cholesterol Calculated 76 <100 mg/dL    Non HDL Cholesterol 95 <130 mg/dL       ASSESSMENT/PLAN:     Hyperlipidemia; controlled   Plan:  As mentioned below    Hypertension; controlled   Associated with the following complications:    CKD   Plan:  As mentioned below    Chronic Kidney Disease Stage 3 , slightly worsened     Associated with the following complications: None     Plan:  As mentioned below            BMI:   Estimated body mass index is 29.69 kg/(m^2) as calculated from the following:    Height as of 1/12/18: 5' 7.25\" (1.708 m).    Weight as of this encounter: 191 lb (86.6 kg).   Weight management plan: Discussed healthy diet and exercise guidelines and patient will follow up in 6 months in clinic to re-evaluate.      1. Benign hypertension with CKD (chronic kidney disease) stage III  BP Readings from Last 6 Encounters:   05/11/18 124/82   01/12/18 186/86   06/02/17 132/68   11/30/16 138/78   09/04/15 124/78   03/13/15 127/77       Blood pressure is at goal.    Reviewed BMP lab results showing worsening kidney functions, likely from current diuretic  Recommended to stop hydrochlorothiazide.  We will continue with metoprolol 100 mg daily, lisinopril 40 mg daily  Will start on amlodipine 10 mg daily at bedtime to avoid/minimize concerns for leg swelling  Continue with weight loss efforts, regular exercises on low-salt diet  Patient currently lives in [place], will have the blood pressure recheck at the time of her physical visit in 3 months or sooner after " local clinic closer to home in [place] if needed.  Continue with home blood pressure monitoring and communicated through my chart for concerns  Dosing and potential medication side effects discussed.  Patient verbalised understanding and is agreeable to the plan.    - amLODIPine (NORVASC) 10 MG tablet; Take 1 tablet (10 mg) by mouth daily  Dispense: 90 tablet; Refill: 1    2. CKD (chronic kidney disease) stage 3, GFR 30-59 ml/min  Last Basic Metabolic Panel:  Lab Results   Component Value Date     05/11/2018      Lab Results   Component Value Date    POTASSIUM 4.2 05/11/2018     Lab Results   Component Value Date    CHLORIDE 105 05/11/2018     Lab Results   Component Value Date    RENA 9.4 05/11/2018     Lab Results   Component Value Date    CO2 29 05/11/2018     Lab Results   Component Value Date    BUN 42 05/11/2018     Lab Results   Component Value Date    CR 1.27 05/11/2018     Lab Results   Component Value Date     05/11/2018     Reviewed abnormal kidney functions which is significantly worsened from her previous numbers  Recommended to avoid NSAID use, push fluids, stop hydrochlorothiazide as mentioned above in problem #1, will recheck at her physical visit in 3 months.    3. Hyperlipidemia LDL goal <130  LDL Cholesterol Calculated   Date Value Ref Range Status   05/11/2018 76 <100 mg/dL Final     Comment:     Desirable:       <100 mg/dl   ]    LDL is at goal after starting on simvastatin 40 mg daily.    But now that patient is starting on amlodipine, she cannot continue simvastatin.  Prescription switched her to pravastatin 40 mg daily.  Dosing and potential medication side effects discussed.  Patient verbalised understanding and is agreeable to the plan.  Continue with healthy eating, regular exercises.  - pravastatin (PRAVACHOL) 40 MG tablet; Take 1 tablet (40 mg) by mouth daily  Dispense: 90 tablet; Refill: 1    Work on weight loss  Regular exercise  Chart documentation done in part with [employer]  Voice recognition Software. Although reviewed after completion, some word and grammatical error may remain.    See Patient Instructions    Laverne Argueta MD  [facility]

## 2018-05-11 NOTE — MR AVS SNAPSHOT
After Visit Summary   5/11/2018    Jannet Love    MRN: [id number]           Patient Information     Date Of Birth          1960        Visit Information        Provider Department      5/11/2018 9:50 AM Laverne Argueta MD [facility]        Today's Diagnoses     Hypertension goal BP (blood pressure) < 140/90    -  1    CKD (chronic kidney disease) stage 3, GFR 30-59 ml/min        Hyperlipidemia LDL goal <130          Care Instructions    Stop HCTZ  Start on AMLODIPINE 10mg at bedtime  Stop Zocor and start on PRAVASTATIN 40 mg at bedtime.  Schedule for non fasting labs and physical in 8/2018          Follow-ups after your visit        Who to contact     If you have questions or need follow up information about today's clinic visit or your schedule please contact [facility] directly at [phone].  Normal or non-critical lab and imaging results will be communicated to you by [employer], letter or phone within 4 business days after the clinic has received the results. If you do not hear from us within 7 days, please contact the clinic through [employer] or phone. If you have a critical or abnormal lab result, we will notify you by phone as soon as possible.  Submit refill requests through [employer] or call your pharmacy and they will forward the refill request to us. Please allow 3 business days for your refill to be completed.          Additional Information About Your Visit        MyChart Information     [employer] gives you secure access to your electronic health record. If you see a primary care provider, you can also send messages to your care team and make appointments. If you have questions, please call your primary care clinic.  If you do not have a primary care provider, please call [phone] and they will assist you.      [employer] is an electronic gateway that provides easy, online access to your medical records. With [employer], you can request a clinic  appointment, read your test results, renew a prescription or communicate with your care team.     To access your existing account, please contact your [facility] or call [phone] for assistance.        Care EveryWhere ID     This is your Care EveryWhere ID. This could be used by other organizations to access your [place] medical records  [id number]        Your Vitals Were     Pulse Temperature Last Period Pulse Oximetry BMI (Body Mass Index)       61 96.8  F (36  C) (Temporal) 08/26/2007 100% 29.69 kg/m2        Blood Pressure from Last 3 Encounters:   05/11/18 124/82   01/12/18 186/86   06/02/17 132/68    Weight from Last 3 Encounters:   05/11/18 191 lb (86.6 kg)   01/12/18 203 lb 4.8 oz (92.2 kg)   06/02/17 192 lb 4.8 oz (87.2 kg)              Today, you had the following     No orders found for display         Today's Medication Changes          These changes are accurate as of 5/11/18 10:12 AM.  If you have any questions, ask your nurse or doctor.               Start taking these medicines.        Dose/Directions    amLODIPine 10 MG tablet   Commonly known as:  NORVASC   Used for:  Hypertension goal BP (blood pressure) < 140/90, CKD (chronic kidney disease) stage 3, GFR 30-59 ml/min   Started by:  Laverne Argueta MD        Dose:  10 mg   Take 1 tablet (10 mg) by mouth daily   Quantity:  90 tablet   Refills:  1       pravastatin 40 MG tablet   Commonly known as:  PRAVACHOL   Used for:  Hyperlipidemia LDL goal <130   Started by:  Laverne Argueta MD        Dose:  40 mg   Take 1 tablet (40 mg) by mouth daily   Quantity:  90 tablet   Refills:  1         Stop taking these medicines if you haven't already. Please contact your care team if you have questions.     hydrochlorothiazide 25 MG tablet   Commonly known as:  HYDRODIURIL   Stopped by:  Laverne Argueta MD           simvastatin 40 MG tablet   Commonly known as:  ZOCOR   Stopped by:  Laverne Argueta MD                 Where to get your medicines      These medications were sent to [facility] - [address]  [address], [facility] [address]     Phone:  [phone]     amLODIPine 10 MG tablet    pravastatin 40 MG tablet                Primary Care Provider Office Phone # Fax #    Laverne Argueta [phone] [address]  [facility] [address]        Equal Access to Services     [facility] : Hadii aad ku hadasho Soomaali, waaxda luqadaha, qaybta kaalmada adeegyada, waxay idiin hayaan adeeg kharash la'pa . So [facility] [phone].    ATENCIÓN: Si habla español, tiene a herrera disposición servicios gratuitos de asistencia lingüística. [facility] [phone].    We comply with applicable federal civil rights laws and [place] laws. We do not discriminate on the basis of race, color, national origin, age, disability, sex, sexual orientation, or gender identity.            Thank you!     Thank you for choosing [facility]  for your care. Our goal is always to provide you with excellent care. Hearing back from our patients is one way we can continue to improve our services. Please take a few minutes to complete the written survey that you may receive in the mail after your visit with us. Thank you!             Your Updated Medication List - Protect others around you: Learn how to safely use, store and throw away your medicines at www.disposemymeds.org.          This list is accurate as of 5/11/18 10:12 AM.  Always use your most recent med list.                   Brand Name Dispense Instructions for use Diagnosis    amLODIPine 10 MG tablet    NORVASC    90 tablet    Take 1 tablet (10 mg) by mouth daily    Hypertension goal BP (blood pressure) < 140/90, CKD (chronic kidney disease) stage 3, GFR 30-59 ml/min       aspirin 81 MG tablet     100    ONE DAILY        Calcium-Vitamin D 600-200 MG-UNIT Tabs      Take 1 tablet by mouth daily         fish oil-omega-3 fatty acids 1000 MG capsule      take 2 g by mouth daily.        lisinopril 40 MG tablet    PRINIVIL/ZESTRIL    90 tablet    Take 1 tablet (40 mg) by mouth daily    Essential hypertension with goal blood pressure less than 140/90       metoprolol succinate 100 MG 24 hr tablet    TOPROL-XL    90 tablet    Take 1 tablet (100 mg) by mouth daily    Essential hypertension with goal blood pressure less than 140/90       pravastatin 40 MG tablet    PRAVACHOL    90 tablet    Take 1 tablet (40 mg) by mouth daily    Hyperlipidemia LDL goal <130

## 2018-06-13 DIAGNOSIS — I10 ESSENTIAL HYPERTENSION WITH GOAL BLOOD PRESSURE LESS THAN 140/90: ICD-10-CM

## 2018-06-13 RX ORDER — HYDROCHLOROTHIAZIDE 25 MG/1
TABLET ORAL
Qty: 90 TABLET | OUTPATIENT
Start: 2018-06-13

## 2018-06-13 NOTE — TELEPHONE ENCOUNTER
hydrochlorothiazide (HYDRODIURIL) 25 MG tablet (Discontinued) 90 tablet 0 4/20/2018 5/11/2018 --   Sig - Route: Take 1 tablet (25 mg) by mouth daily - Oral   Class: E-Prescribe   Reason for Discontinue: Side effects       Diuretics (Including Combos) Protocol Failed6/13 5:43 AM   Normal serum creatinine on file in past 12 months    Blood pressure under 140/90 in past 12 months    Recent (12 mo) or future (30 days) visit within the authorizing provider's specialty    Patient is age 18 or older    No active pregancy on record    Normal serum potassium on file in past 12 months    Normal serum sodium on file in past 12 months    No positive pregnancy test in past 12 months     Discontinued for side effects. Alanna Vargas RN

## 2018-09-12 ENCOUNTER — DOCUMENTATION ONLY (OUTPATIENT)
Dept: LAB | Facility: CLINIC | Age: 58
End: 2018-09-12

## 2018-09-12 DIAGNOSIS — Z13.0 SCREENING FOR DEFICIENCY ANEMIA: ICD-10-CM

## 2018-09-12 DIAGNOSIS — Z13.29 SCREENING FOR THYROID DISORDER: ICD-10-CM

## 2018-09-12 DIAGNOSIS — I10 ESSENTIAL HYPERTENSION WITH GOAL BLOOD PRESSURE LESS THAN 140/90: ICD-10-CM

## 2018-09-12 DIAGNOSIS — Z13.1 SCREENING FOR DIABETES MELLITUS (DM): ICD-10-CM

## 2018-09-12 DIAGNOSIS — R73.03 PREDIABETES: ICD-10-CM

## 2018-09-12 DIAGNOSIS — E78.5 HYPERLIPIDEMIA LDL GOAL <130: Primary | ICD-10-CM

## 2018-09-12 DIAGNOSIS — N18.30 CKD (CHRONIC KIDNEY DISEASE) STAGE 3, GFR 30-59 ML/MIN (H): ICD-10-CM

## 2018-09-12 NOTE — PROGRESS NOTES
Pending lab orders for 9/14/18. Please review, sign, and close encounter. Thank you. Alanna Vargas RN

## 2018-09-14 ENCOUNTER — OFFICE VISIT (OUTPATIENT)
Dept: PEDIATRICS | Facility: CLINIC | Age: 58
End: 2018-09-14
Payer: COMMERCIAL

## 2018-09-14 VITALS
SYSTOLIC BLOOD PRESSURE: 129 MMHG | HEIGHT: 67 IN | BODY MASS INDEX: 30.2 KG/M2 | WEIGHT: 192.4 LBS | TEMPERATURE: 98.2 F | HEART RATE: 58 BPM | DIASTOLIC BLOOD PRESSURE: 77 MMHG | OXYGEN SATURATION: 96 %

## 2018-09-14 DIAGNOSIS — I10 ESSENTIAL HYPERTENSION WITH GOAL BLOOD PRESSURE LESS THAN 140/90: ICD-10-CM

## 2018-09-14 DIAGNOSIS — Z13.29 SCREENING FOR THYROID DISORDER: ICD-10-CM

## 2018-09-14 DIAGNOSIS — R73.03 PREDIABETES: ICD-10-CM

## 2018-09-14 DIAGNOSIS — E78.5 HYPERLIPIDEMIA LDL GOAL <130: ICD-10-CM

## 2018-09-14 DIAGNOSIS — I12.9 BENIGN HYPERTENSION WITH CKD (CHRONIC KIDNEY DISEASE) STAGE III (H): ICD-10-CM

## 2018-09-14 DIAGNOSIS — N18.30 BENIGN HYPERTENSION WITH CKD (CHRONIC KIDNEY DISEASE) STAGE III (H): ICD-10-CM

## 2018-09-14 DIAGNOSIS — Z13.0 SCREENING FOR DEFICIENCY ANEMIA: ICD-10-CM

## 2018-09-14 DIAGNOSIS — Z11.4 SCREENING FOR HUMAN IMMUNODEFICIENCY VIRUS: ICD-10-CM

## 2018-09-14 DIAGNOSIS — Z13.1 SCREENING FOR DIABETES MELLITUS (DM): ICD-10-CM

## 2018-09-14 DIAGNOSIS — M72.2 PLANTAR FASCIITIS: ICD-10-CM

## 2018-09-14 DIAGNOSIS — N18.30 CKD (CHRONIC KIDNEY DISEASE) STAGE 3, GFR 30-59 ML/MIN (H): ICD-10-CM

## 2018-09-14 DIAGNOSIS — Z00.00 ROUTINE GENERAL MEDICAL EXAMINATION AT A HEALTH CARE FACILITY: Primary | ICD-10-CM

## 2018-09-14 DIAGNOSIS — E66.9 OBESITY (BMI 30-39.9): ICD-10-CM

## 2018-09-14 LAB
ALBUMIN SERPL-MCNC: 3.9 G/DL (ref 3.4–5)
ALP SERPL-CCNC: 70 U/L (ref 40–150)
ALT SERPL W P-5'-P-CCNC: 27 U/L (ref 0–50)
ANION GAP SERPL CALCULATED.3IONS-SCNC: 5 MMOL/L (ref 3–14)
AST SERPL W P-5'-P-CCNC: 20 U/L (ref 0–45)
BASOPHILS # BLD AUTO: 0 10E9/L (ref 0–0.2)
BASOPHILS NFR BLD AUTO: 0.6 %
BILIRUB SERPL-MCNC: 0.4 MG/DL (ref 0.2–1.3)
BUN SERPL-MCNC: 26 MG/DL (ref 7–30)
CALCIUM SERPL-MCNC: 9 MG/DL (ref 8.5–10.1)
CHLORIDE SERPL-SCNC: 110 MMOL/L (ref 94–109)
CO2 SERPL-SCNC: 29 MMOL/L (ref 20–32)
CREAT SERPL-MCNC: 1.06 MG/DL (ref 0.52–1.04)
CREAT UR-MCNC: 400 MG/DL
DIFFERENTIAL METHOD BLD: NORMAL
EOSINOPHIL # BLD AUTO: 0.1 10E9/L (ref 0–0.7)
EOSINOPHIL NFR BLD AUTO: 1.5 %
ERYTHROCYTE [DISTWIDTH] IN BLOOD BY AUTOMATED COUNT: 11.8 % (ref 10–15)
GFR SERPL CREATININE-BSD FRML MDRD: 53 ML/MIN/1.7M2
GLUCOSE SERPL-MCNC: 86 MG/DL (ref 70–99)
HBA1C MFR BLD: 5.7 % (ref 0–5.6)
HCT VFR BLD AUTO: 42.1 % (ref 35–47)
HGB BLD-MCNC: 13.9 G/DL (ref 11.7–15.7)
IMM GRANULOCYTES # BLD: 0 10E9/L (ref 0–0.4)
IMM GRANULOCYTES NFR BLD: 0.1 %
LYMPHOCYTES # BLD AUTO: 2.1 10E9/L (ref 0.8–5.3)
LYMPHOCYTES NFR BLD AUTO: 31 %
MCH RBC QN AUTO: 30.3 PG (ref 26.5–33)
MCHC RBC AUTO-ENTMCNC: 33 G/DL (ref 31.5–36.5)
MCV RBC AUTO: 92 FL (ref 78–100)
MICROALBUMIN UR-MCNC: 44 MG/L
MICROALBUMIN/CREAT UR: 11.05 MG/G CR (ref 0–25)
MONOCYTES # BLD AUTO: 0.6 10E9/L (ref 0–1.3)
MONOCYTES NFR BLD AUTO: 8.4 %
NEUTROPHILS # BLD AUTO: 4 10E9/L (ref 1.6–8.3)
NEUTROPHILS NFR BLD AUTO: 58.4 %
PLATELET # BLD AUTO: 244 10E9/L (ref 150–450)
POTASSIUM SERPL-SCNC: 3.9 MMOL/L (ref 3.4–5.3)
PROT SERPL-MCNC: 7.5 G/DL (ref 6.8–8.8)
RBC # BLD AUTO: 4.59 10E12/L (ref 3.8–5.2)
SODIUM SERPL-SCNC: 144 MMOL/L (ref 133–144)
TSH SERPL DL<=0.005 MIU/L-ACNC: 1.84 MU/L (ref 0.4–4)
WBC # BLD AUTO: 6.9 10E9/L (ref 4–11)

## 2018-09-14 PROCEDURE — 83036 HEMOGLOBIN GLYCOSYLATED A1C: CPT | Performed by: FAMILY MEDICINE

## 2018-09-14 PROCEDURE — 85025 COMPLETE CBC W/AUTO DIFF WBC: CPT | Performed by: FAMILY MEDICINE

## 2018-09-14 PROCEDURE — 87389 HIV-1 AG W/HIV-1&-2 AB AG IA: CPT | Performed by: FAMILY MEDICINE

## 2018-09-14 PROCEDURE — 80053 COMPREHEN METABOLIC PANEL: CPT | Performed by: FAMILY MEDICINE

## 2018-09-14 PROCEDURE — 84443 ASSAY THYROID STIM HORMONE: CPT | Performed by: FAMILY MEDICINE

## 2018-09-14 PROCEDURE — 36415 COLL VENOUS BLD VENIPUNCTURE: CPT | Performed by: FAMILY MEDICINE

## 2018-09-14 PROCEDURE — 99396 PREV VISIT EST AGE 40-64: CPT | Performed by: FAMILY MEDICINE

## 2018-09-14 PROCEDURE — 82043 UR ALBUMIN QUANTITATIVE: CPT | Performed by: FAMILY MEDICINE

## 2018-09-14 RX ORDER — AMLODIPINE BESYLATE 10 MG/1
10 TABLET ORAL DAILY
Qty: 90 TABLET | Refills: 2 | Status: SHIPPED | OUTPATIENT
Start: 2018-09-14 | End: 2019-11-05

## 2018-09-14 RX ORDER — LISINOPRIL 40 MG/1
40 TABLET ORAL DAILY
Qty: 90 TABLET | Refills: 2 | Status: SHIPPED | OUTPATIENT
Start: 2018-09-14 | End: 2019-11-05

## 2018-09-14 RX ORDER — PRAVASTATIN SODIUM 40 MG
40 TABLET ORAL DAILY
Qty: 90 TABLET | Refills: 3 | Status: SHIPPED | OUTPATIENT
Start: 2018-09-14 | End: 2019-11-05

## 2018-09-14 RX ORDER — METOPROLOL SUCCINATE 100 MG/1
100 TABLET, EXTENDED RELEASE ORAL DAILY
Qty: 90 TABLET | Refills: 2 | Status: SHIPPED | OUTPATIENT
Start: 2018-09-14 | End: 2019-11-05

## 2018-09-14 ASSESSMENT — PAIN SCALES - GENERAL: PAINLEVEL: MODERATE PAIN (4)

## 2018-09-14 NOTE — MR AVS SNAPSHOT
After Visit Summary   9/14/2018    Jannet Love    MRN: [id number]           Patient Information     Date Of Birth          1960        Visit Information        Provider Department      9/14/2018 1:30 PM Laverne Argueta MD [facility]        Today's Diagnoses     Routine general medical examination at a health care facility    -  1    Benign hypertension with CKD (chronic kidney disease) stage III        Essential hypertension with goal blood pressure less than 140/90        Hyperlipidemia LDL goal <130        Screening for human immunodeficiency virus        Prediabetes          Care Instructions    Schedule for recheck and non fasting labs in 5/2019    Preventive Health Recommendations  Female Ages 50 - 64    Yearly exam: See your health care provider every year in order to  o Review health changes.   o Discuss preventive care.    o Review your medicines if your doctor has prescribed any.      Get a Pap test every three years (unless you have an abnormal result and your provider advises testing more often).    If you get Pap tests with HPV test, you only need to test every 5 years, unless you have an abnormal result.     You do not need a Pap test if your uterus was removed (hysterectomy) and you have not had cancer.    You should be tested each year for STDs (sexually transmitted diseases) if you're at risk.     Have a mammogram every 1 to 2 years.    Have a colonoscopy at age 50, or have a yearly FIT test (stool test). These exams screen for colon cancer.      Have a cholesterol test every 5 years, or more often if advised.    Have a diabetes test (fasting glucose) every three years. If you are at risk for diabetes, you should have this test more often.     If you are at risk for osteoporosis (brittle bone disease), think about having a bone density scan (DEXA).    Shots: Get a flu shot each year. Get a tetanus shot every 10 years.    Nutrition:     Eat at least 5  servings of fruits and vegetables each day.    Eat whole-grain bread, whole-wheat pasta and brown rice instead of white grains and rice.    Get adequate Calcium and Vitamin D.     Lifestyle    Exercise at least 150 minutes a week (30 minutes a day, 5 days a week). This will help you control your weight and prevent disease.    Limit alcohol to one drink per day.    No smoking.     Wear sunscreen to prevent skin cancer.     See your dentist every six months for an exam and cleaning.    See your eye doctor every 1 to 2 years.    Understanding Heel Pain    Your heel is the back part of your foot. A band of tissue called the plantar fascia connects the heel bone to the bones in the ball of your foot. Nerves run from the heel up the inside of your ankle and into your leg. When you feel pain in the bottom of your heel, the plantar fascia may be inflamed. Overuse, Achilles tightness, and excess body weight can cause the tissue to tear or pull away from the bone. Sometimes the inflamed plantar fascia also irritates a nerve, causing more pain.  What causes heel pain?  Wearing shoes with poor cushioning can irritate the tissue in your heel (plantar fascia). Being overweight or standing for long periods can also irritate the tissue. Running, walking, tennis, and other sports that put stress on the heels can cause tiny tears in the tissue. If your lower leg muscles are tight, this is more likely to happen. A tight Achilles tendon will also contribute to heel pain.  Symptoms  You may feel pain on the bottom or on the inside edge of your heel. The pain may be sharp when you get out of bed or when you stand up after sitting for a while. You may feel a dull ache in your heel after you ve been standing for a long time on a hard surface. Running can also cause a dull ache.  Preventing future problems  To prevent future heel pain, wear shoes with well-cushioned heels. And do exercises prescribed by your healthcare provider to stretch  the plantar fascia and the muscles in the lower leg.   Date Last Reviewed: 10/1/2017    0502-7600 [employer]. [address] [place] [address], [place] [address]. All rights reserved. This information is not intended as a substitute for professional medical care. Always follow your healthcare professional's instructions.        Treating Plantar Fasciitis  First, your healthcare provider tries to find out the cause of your problem. He or she can then suggest ways to ease pain. If your pain is due to poor foot mechanics, occasionally custom-made shoe inserts (orthoses) may help.    Ease symptoms    To relieve mild symptoms, try aspirin, ibuprofen, or other medicines as directed. Rubbing ice on the area may also help.    To lessen severe pain and swelling, your healthcare provider may give you pills or injections. In some cases, you may need a walking cast. Physical therapy, such as ultrasound or a daily stretching program, may also be recommended. Surgery is rarely needed.    To ease symptoms caused by poor foot mechanics, your foot may be taped. This supports the arch and temporarily controls movement. Night splints may also help by stretching the fascia.  Control movement  If taping helps, your healthcare provider may prescribe orthoses. These are inserts built from plaster casts of your feet. They control the way your foot moves. As a result, your symptoms may go away.  Reduce overuse  Every time your foot strikes the ground, the plantar fascia is stretched. You can lessen the strain on the plantar fascia and the chance of overuse by:    Losing any excess weight    Not running on hard or uneven ground    Using orthoses, if recommended, in your shoes and house slippers  If surgery is needed  Your healthcare provider may consider surgery if other types of treatment don't control your pain. During surgery, the plantar fascia is partially cut to release tension. As you heal, fibrous tissue fills the space between  the heel bone and the plantar fascia.   Date Last Reviewed: 1/1/2018 2000-2017 [employer]. [address] [place] [address], [place] [address]. All rights reserved. This information is not intended as a substitute for professional medical care. Always follow your healthcare professional's instructions.                Follow-ups after your visit        Follow-up notes from your care team     Return in about 8 months (around 5/1/2019) for Non-Fasting lab work, Medication check.      Who to contact     If you have questions or need follow up information about today's clinic visit or your schedule please contact [facility] directly at [phone].  Normal or non-critical lab and imaging results will be communicated to you by Lovelogicahart, letter or phone within 4 business days after the clinic has received the results. If you do not hear from us within 7 days, please contact the clinic through Lovelogicahart or phone. If you have a critical or abnormal lab result, we will notify you by phone as soon as possible.  Submit refill requests through Meetmeals or call your pharmacy and they will forward the refill request to us. Please allow 3 business days for your refill to be completed.          Additional Information About Your Visit        MyChart Information     Meet[employer]als gives you secure access to your electronic health record. If you see a primary care provider, you can also send messages to your care team and make appointments. If you have questions, please call your primary care clinic.  If you do not have a primary care provider, please call [phone] and they will assist you.      Meetmeals is an electronic gateway that provides easy, online access to your medical records. With Meetmeals, you can request a clinic appointment, read your test results, renew a prescription or communicate with your care team.     To access your existing account, please contact your [facility] or  "call [phone] for assistance.        Care EveryWhere ID     This is your Care EveryWhere ID. This could be used by other organizations to access your [place] medical records  [id number]        Your Vitals Were     Pulse Temperature Height Last Period Pulse Oximetry BMI (Body Mass Index)    58 98.2  F (36.8  C) (Oral) 5' 7\" (1.702 m) 08/26/2007 96% 30.13 kg/m2       Blood Pressure from Last 3 Encounters:   09/14/18 129/77   05/11/18 124/82   01/12/18 186/86    Weight from Last 3 Encounters:   09/14/18 192 lb 6.4 oz (87.3 kg)   05/11/18 191 lb (86.6 kg)   01/12/18 203 lb 4.8 oz (92.2 kg)              We Performed the Following     HIV Antigen Antibody Combo          Where to get your medicines      These medications were sent to [email or web address] - [address], [facility] [address]     Phone:  [phone]     amLODIPine 10 MG tablet    lisinopril 40 MG tablet    metoprolol succinate 100 MG 24 hr tablet    pravastatin 40 MG tablet          Primary Care Provider Office Phone # Fax #    Laverne Argueta [phone] [address] [facility] [address]        Equal Access to Services     SILAS MYERS : Hadii tasneem ku hadasho Soomaali, waaxda luqadaha, qaybta kaalmada ingrid melo. So [facility] [phone].    ATENCIÓN: Si habla español, tiene a herrera disposición servicios gratuitos de asistencia lingüística. [place] [facility] [phone].    We comply with applicable federal civil rights laws and [place] laws. We do not discriminate on the basis of race, color, national origin, age, disability, sex, sexual orientation, or gender identity.            Thank you!     Thank you for choosing [facility]  for your care. Our goal is always to provide you with excellent care. Hearing back from our patients is one way we can continue to improve our services. Please take a few minutes to complete " the written survey that you may receive in the mail after your visit with us. Thank you!             Your Updated Medication List - Protect others around you: Learn how to safely use, store and throw away your medicines at www.disposemymeds.org.          This list is accurate as of 9/14/18  1:59 PM.  Always use your most recent med list.                   Brand Name Dispense Instructions for use Diagnosis    amLODIPine 10 MG tablet    NORVASC    90 tablet    Take 1 tablet (10 mg) by mouth daily    Benign hypertension with CKD (chronic kidney disease) stage III       aspirin 81 MG tablet     100    ONE DAILY        Calcium-Vitamin D 600-200 MG-UNIT Tabs      Take 1 tablet by mouth daily        fish oil-omega-3 fatty acids 1000 MG capsule      take 2 g by mouth daily.        lisinopril 40 MG tablet    PRINIVIL/ZESTRIL    90 tablet    Take 1 tablet (40 mg) by mouth daily    Essential hypertension with goal blood pressure less than 140/90       metoprolol succinate 100 MG 24 hr tablet    TOPROL-XL    90 tablet    Take 1 tablet (100 mg) by mouth daily    Essential hypertension with goal blood pressure less than 140/90       pravastatin 40 MG tablet    PRAVACHOL    90 tablet    Take 1 tablet (40 mg) by mouth daily    Hyperlipidemia LDL goal <130

## 2018-09-14 NOTE — PATIENT INSTRUCTIONS
Schedule for recheck and  fasting labs in 5/2019    Preventive Health Recommendations  Female Ages 50 - 64    Yearly exam: See your health care provider every year in order to  o Review health changes.   o Discuss preventive care.    o Review your medicines if your doctor has prescribed any.      Get a Pap test every three years (unless you have an abnormal result and your provider advises testing more often).    If you get Pap tests with HPV test, you only need to test every 5 years, unless you have an abnormal result.     You do not need a Pap test if your uterus was removed (hysterectomy) and you have not had cancer.    You should be tested each year for STDs (sexually transmitted diseases) if you're at risk.     Have a mammogram every 1 to 2 years.    Have a colonoscopy at age 50, or have a yearly FIT test (stool test). These exams screen for colon cancer.      Have a cholesterol test every 5 years, or more often if advised.    Have a diabetes test (fasting glucose) every three years. If you are at risk for diabetes, you should have this test more often.     If you are at risk for osteoporosis (brittle bone disease), think about having a bone density scan (DEXA).    Shots: Get a flu shot each year. Get a tetanus shot every 10 years.    Nutrition:     Eat at least 5 servings of fruits and vegetables each day.    Eat whole-grain bread, whole-wheat pasta and brown rice instead of white grains and rice.    Get adequate Calcium and Vitamin D.     Lifestyle    Exercise at least 150 minutes a week (30 minutes a day, 5 days a week). This will help you control your weight and prevent disease.    Limit alcohol to one drink per day.    No smoking.     Wear sunscreen to prevent skin cancer.     See your dentist every six months for an exam and cleaning.    See your eye doctor every 1 to 2 years.    Understanding Heel Pain    Your heel is the back part of your foot. A band of tissue called the plantar fascia connects the  heel bone to the bones in the ball of your foot. Nerves run from the heel up the inside of your ankle and into your leg. When you feel pain in the bottom of your heel, the plantar fascia may be inflamed. Overuse, Achilles tightness, and excess body weight can cause the tissue to tear or pull away from the bone. Sometimes the inflamed plantar fascia also irritates a nerve, causing more pain.  What causes heel pain?  Wearing shoes with poor cushioning can irritate the tissue in your heel (plantar fascia). Being overweight or standing for long periods can also irritate the tissue. Running, walking, tennis, and other sports that put stress on the heels can cause tiny tears in the tissue. If your lower leg muscles are tight, this is more likely to happen. A tight Achilles tendon will also contribute to heel pain.  Symptoms  You may feel pain on the bottom or on the inside edge of your heel. The pain may be sharp when you get out of bed or when you stand up after sitting for a while. You may feel a dull ache in your heel after you ve been standing for a long time on a hard surface. Running can also cause a dull ache.  Preventing future problems  To prevent future heel pain, wear shoes with well-cushioned heels. And do exercises prescribed by your healthcare provider to stretch the plantar fascia and the muscles in the lower leg.   Date Last Reviewed: 10/1/2017    2242-9259 [employer]. [address] [place] [address]. All rights reserved. This information is not intended as a substitute for professional medical care. Always follow your healthcare professional's instructions.        Treating Plantar Fasciitis  First, your healthcare provider tries to find out the cause of your problem. He or she can then suggest ways to ease pain. If your pain is due to poor foot mechanics, occasionally custom-made shoe inserts (orthoses) may help.    Ease symptoms    To relieve mild symptoms, try aspirin,  ibuprofen, or other medicines as directed. Rubbing ice on the area may also help.    To lessen severe pain and swelling, your healthcare provider may give you pills or injections. In some cases, you may need a walking cast. Physical therapy, such as ultrasound or a daily stretching program, may also be recommended. Surgery is rarely needed.    To ease symptoms caused by poor foot mechanics, your foot may be taped. This supports the arch and temporarily controls movement. Night splints may also help by stretching the fascia.  Control movement  If taping helps, your healthcare provider may prescribe orthoses. These are inserts built from plaster casts of your feet. They control the way your foot moves. As a result, your symptoms may go away.  Reduce overuse  Every time your foot strikes the ground, the plantar fascia is stretched. You can lessen the strain on the plantar fascia and the chance of overuse by:    Losing any excess weight    Not running on hard or uneven ground    Using orthoses, if recommended, in your shoes and house slippers  If surgery is needed  Your healthcare provider may consider surgery if other types of treatment don't control your pain. During surgery, the plantar fascia is partially cut to release tension. As you heal, fibrous tissue fills the space between the heel bone and the plantar fascia.   Date Last Reviewed: 1/1/2018 2000-2017 [employer]. [address] [place] [address], [place] [address]. All rights reserved. This information is not intended as a substitute for professional medical care. Always follow your healthcare professional's instructions.

## 2018-09-14 NOTE — PROGRESS NOTES
SUBJECTIVE:   CC: Jannet Love is an 57 year old woman who presents for preventive health visit.     Healthy Habits:    Do you get at least three servings of calcium containing foods daily (dairy, green leafy vegetables, etc.)? no, taking calcium and/or vitamin D supplement: yes -     Amount of exercise or daily activities, outside of work: no current routine since foot pain    Problems taking medications regularly No    Medication side effects: No    Have you had an eye exam in the past two years? yes    Do you see a dentist twice per year? yes    Do you have sleep apnea, excessive snoring or daytime drowsiness?no      Hyperlipidemia Follow-Up      Rate your low fat/cholesterol diet?: not monitoring fat    Taking statin?  yes    Other lipid medications/supplements?:  none    Hypertension Follow-up      Outpatient blood pressures are not being checked.    Low Salt Diet: no added salt    Chronic Kidney Disease Follow-up      Current NSAID use?  No      Today's PHQ-2 Score:   PHQ-2 ( 1999 Pfizer) 9/14/2018 5/30/2017   Q1: Little interest or pleasure in doing things 0 0   Q2: Feeling down, depressed or hopeless 0 0   PHQ-2 Score 0 0   Q1: Little interest or pleasure in doing things - Not at all   Q2: Feeling down, depressed or hopeless - Not at all   PHQ-2 Score - 0       Abuse: Current or Past(Physical, Sexual or Emotional)- No  Do you feel safe in your environment - Yes    Social History   Substance Use Topics     Smoking status: Never Smoker     Smokeless tobacco: Never Used     Alcohol use Yes      Comment: ocassionally     If you drink alcohol do you typically have >3 drinks per day or >7 drinks per week? No                     Reviewed orders with patient.  Reviewed health maintenance and updated orders accordingly - Yes  Labs reviewed in EPIC  BP Readings from Last 3 Encounters:   09/14/18 129/77   05/11/18 124/82   01/12/18 186/86    Wt Readings from Last 3 Encounters:   09/14/18 192 lb 6.4 oz (87.3 kg)    05/11/18 191 lb (86.6 kg)   01/12/18 203 lb 4.8 oz (92.2 kg)                  Patient Active Problem List   Diagnosis     Excessive or frequent menstruation     Surveillance of previously prescribed intrauterine contraceptive device     Endometrial hyperplasia     Dyslipidemia     Dermatofibroma of lower extremity     Prediabetes     Hyperlipidemia LDL goal <130     CKD (chronic kidney disease) stage 3, GFR 30-59 ml/min     Essential hypertension with goal blood pressure less than 140/90     Obesity (BMI 30-39.9)     Plantar fasciitis     Past Surgical History:   Procedure Laterality Date     C NONSPECIFIC PROCEDURE  2000    Hysteroscopy with deep endometrial biopsy. Endocervical curettage.     C NONSPECIFIC PROCEDURE  1984    brachial cleft cyst repair     HEAD & NECK SURGERY         Social History   Substance Use Topics     Smoking status: Never Smoker     Smokeless tobacco: Never Used     Alcohol use Yes      Comment: ocassionally     Family History   Problem Relation Age of Onset     Hypertension Mother      C.A.D. Mother      MI     C.A.D. Father      MI     Cardiovascular Father 49     MI, pacemaker in 2009     HEART DISEASE Father      Prostate Cancer Father      C.A.D. Brother      MI at age 41     Cardiovascular Brother      HEART DISEASE Brother      C.A.D. Maternal Grandfather      Diabetes Maternal Grandmother      GASTROINTESTINAL DISEASE Daughter 11     Crohns Disease     Breast Cancer Paternal Grandmother      Alcohol/Drug Paternal Grandfather          Current Outpatient Prescriptions   Medication Sig Dispense Refill     amLODIPine (NORVASC) 10 MG tablet Take 1 tablet (10 mg) by mouth daily 90 tablet 2     ASPIRIN 81 MG OR TABS ONE DAILY 100 3     fish oil-omega-3 fatty acids (FISH OIL) 1000 MG capsule take 2 g by mouth daily.       lisinopril (PRINIVIL/ZESTRIL) 40 MG tablet Take 1 tablet (40 mg) by mouth daily 90 tablet 2     metoprolol succinate (TOPROL-XL) 100 MG 24 hr tablet Take 1 tablet (100  mg) by mouth daily 90 tablet 2     pravastatin (PRAVACHOL) 40 MG tablet Take 1 tablet (40 mg) by mouth daily 90 tablet 3     Calcium-Vitamin D 600-200 MG-UNIT TABS Take 1 tablet by mouth daily        [DISCONTINUED] amLODIPine (NORVASC) 10 MG tablet Take 1 tablet (10 mg) by mouth daily 90 tablet 1     [DISCONTINUED] lisinopril (PRINIVIL/ZESTRIL) 40 MG tablet Take 1 tablet (40 mg) by mouth daily 90 tablet 2     [DISCONTINUED] metoprolol succinate (TOPROL-XL) 100 MG 24 hr tablet Take 1 tablet (100 mg) by mouth daily 90 tablet 2     [DISCONTINUED] pravastatin (PRAVACHOL) 40 MG tablet Take 1 tablet (40 mg) by mouth daily 90 tablet 1     Allergies   Allergen Reactions     No Known Drug Allergies      Recent Labs   Lab Test  09/14/18   0958  05/11/18   0925  12/27/17   0739  12/07/16   0716  12/07/16   0710  09/04/15   0730   A1C  5.7*   --    --   5.6   --   5.6   LDL   --   76  164*  133*   --   76   HDL   --   45*  47*  46*   --   44*   TRIG   --   97  231*  85   --   102   ALT  27   --    --   26   --   31   CR  1.06*  1.27*  0.81  0.92   --   1.01   GFRESTIMATED  53*  43*  72  63   --   57*   GFRESTBLACK  64  52*  87  76   --   69   POTASSIUM  3.9  4.2  4.0  4.4   --   4.1   TSH  1.84   --    --    --   2.08   --         Patient over age 50, mutual decision to screen reflected in health maintenance.    Pertinent mammograms are reviewed under the imaging tab.  History of abnormal Pap smear: NO - age 30- 65 PAP every 3 years recommended  PAP / HPV Latest Ref Rng & Units 6/2/2017 3/22/2013 9/17/2010   PAP - NIL NIL NIL   HPV 16 DNA NEG Negative - -   HPV 18 DNA NEG Negative - -   OTHER HR HPV NEG Negative - -     Reviewed and updated as needed this visit by clinical staff  Tobacco  Allergies  Meds  Med Hx  Surg Hx  Fam Hx  Soc Hx        Reviewed and updated as needed this visit by Provider          Past Medical History:   Diagnosis Date     Bronchitis, not specified as acute or chronic      EXCESSIVE MENSTRUATION  "2007    Mirena IUD placed for simple hyperplasia     INSERTION OF IUD 10/3/2007    Mirena IUD  For simple hyperplasia of the endometrium     Mixed hyperlipidemia      NONSPECIFIC MEDICAL HISTORY     cough     Unspecified essential hypertension       Past Surgical History:   Procedure Laterality Date     C NONSPECIFIC PROCEDURE      Hysteroscopy with deep endometrial biopsy. Endocervical curettage.     C NONSPECIFIC PROCEDURE      brachial cleft cyst repair     HEAD & NECK SURGERY       Obstetric History       T2      L2     SAB1   TAB0   Ectopic0   Multiple0   Live Births2       # Outcome Date GA Lbr Scar/2nd Weight Sex Delivery Anes PTL Lv   3 Term 86 40w0d   M    [facility]      Name: Mars   2 SAB 85     SAB   DEC   1 Term 82 40w0d   F    [facility]      Name: Tonya          ROS:  CONSTITUTIONAL: NEGATIVE for fever, chills, change in weight  INTEGUMENTARY/SKIN: NEGATIVE for worrisome rashes, moles or lesions  EYES: NEGATIVE for vision changes or irritation  ENT: NEGATIVE for ear, mouth and throat problems  RESP: NEGATIVE for significant cough or SOB  BREAST: NEGATIVE for masses, tenderness or discharge  CV: NEGATIVE for chest pain, palpitations or peripheral edema  CV: Hx HTN  GI: NEGATIVE for nausea, abdominal pain, heartburn, or change in bowel habits  : NEGATIVE for unusual urinary or vaginal symptoms. No vaginal bleeding.  MUSCULOSKELETAL: Intermittent bilateral heel pain with no history of fall or trauma  NEURO: NEGATIVE for weakness, dizziness or paresthesias  ENDOCRINE: NEGATIVE for temperature intolerance, skin/hair changes  HEME/ALLERGY/IMMUNE: NEGATIVE for bleeding problems  PSYCHIATRIC: NEGATIVE for changes in mood or affect     OBJECTIVE:   /77 (BP Location: Right arm, Patient Position: Sitting, Cuff Size: Adult Regular)  Pulse 58  Temp 98.2  F (36.8  C) (Oral)  Ht 5' 7\" (1.702 m)  Wt 192 lb 6.4 oz (87.3 kg)  LMP 2007  SpO2 96%  BMI 30.13 " kg/m2  EXAM:  GENERAL APPEARANCE: healthy, alert and no distress  EYES: Eyes grossly normal to inspection, PERRL and conjunctivae and sclerae normal  HENT: ear canals and TM's normal, nose and mouth without ulcers or lesions, oropharynx clear and oral mucous membranes moist  NECK: no adenopathy, no asymmetry, masses, or scars and thyroid normal to palpation  RESP: lungs clear to auscultation - no rales, rhonchi or wheezes  BREAST: normal without masses, tenderness or nipple discharge and no palpable axillary masses or adenopathy  CV: regular rate and rhythm, normal S1 S2, no S3 or S4, no murmur, click or rub, no peripheral edema and peripheral pulses strong  ABDOMEN: soft, nontender, no hepatosplenomegaly, no masses and bowel sounds normal   (female): normal female external genitalia, normal urethral meatus, vaginal mucosal atrophy noted, normal cervix, adnexae, and uterus without masses or abnormal discharge  MS: no musculoskeletal defects are noted and gait is age appropriate without ataxia  MS: Normal gait, moderate tenderness on either side of the heel on both sites  SKIN: no suspicious lesions or rashes  NEURO: Normal strength and tone, sensory exam grossly normal, mentation intact and speech normal  PSYCH: mentation appears normal and affect normal/bright    Diagnostic Test Results:  Results for orders placed or performed in visit on 09/14/18 (from the past 24 hour(s))   CBC with platelets differential   Result Value Ref Range    WBC 6.9 4.0 - 11.0 10e9/L    RBC Count 4.59 3.8 - 5.2 10e12/L    Hemoglobin 13.9 11.7 - 15.7 g/dL    Hematocrit 42.1 35.0 - 47.0 %    MCV 92 78 - 100 fl    MCH 30.3 26.5 - 33.0 pg    MCHC 33.0 31.5 - 36.5 g/dL    RDW 11.8 10.0 - 15.0 %    Platelet Count 244 150 - 450 10e9/L    % Neutrophils 58.4 %    % Lymphocytes 31.0 %    % Monocytes 8.4 %    % Eosinophils 1.5 %    % Basophils 0.6 %    % Immature Granulocytes 0.1 %    Absolute Neutrophil 4.0 1.6 - 8.3 10e9/L    Absolute  Lymphocytes 2.1 0.8 - 5.3 10e9/L    Absolute Monocytes 0.6 0.0 - 1.3 10e9/L    Absolute Eosinophils 0.1 0.0 - 0.7 10e9/L    Absolute Basophils 0.0 0.0 - 0.2 10e9/L    Abs Immature Granulocytes 0.0 0 - 0.4 10e9/L    Diff Method Automated Method    **Comprehensive metabolic panel FUTURE anytime   Result Value Ref Range    Sodium 144 133 - 144 mmol/L    Potassium 3.9 3.4 - 5.3 mmol/L    Chloride 110 (H) 94 - 109 mmol/L    Carbon Dioxide 29 20 - 32 mmol/L    Anion Gap 5 3 - 14 mmol/L    Glucose 86 70 - 99 mg/dL    Urea Nitrogen 26 7 - 30 mg/dL    Creatinine 1.06 (H) 0.52 - 1.04 mg/dL    GFR Estimate 53 (L) >60 mL/min/1.7m2    GFR Estimate If Black 64 >60 mL/min/1.7m2    Calcium 9.0 8.5 - 10.1 mg/dL    Bilirubin Total 0.4 0.2 - 1.3 mg/dL    Albumin 3.9 3.4 - 5.0 g/dL    Protein Total 7.5 6.8 - 8.8 g/dL    Alkaline Phosphatase 70 40 - 150 U/L    ALT 27 0 - 50 U/L    AST 20 0 - 45 U/L   **A1C FUTURE anytime   Result Value Ref Range    Hemoglobin A1C 5.7 (H) 0 - 5.6 %   **TSH with free T4 reflex FUTURE anytime   Result Value Ref Range    TSH 1.84 0.40 - 4.00 mU/L   Albumin Random Urine Quantitative with Creat Ratio   Result Value Ref Range    Creatinine Urine 400 mg/dL    Albumin Urine mg/L 44 mg/L    Albumin Urine mg/g Cr 11.05 0 - 25 mg/g Cr       ASSESSMENT/PLAN:   1. Routine general medical examination at a health care facility  Discussed on regular exercises, daily calcium intake, healthy eating, self breast exams monthly and routine dental checks  - HIV Antigen Antibody Combo    2. Benign hypertension with CKD (chronic kidney disease) stage III  BP Readings from Last 6 Encounters:   09/14/18 129/77   05/11/18 124/82   01/12/18 186/86   06/02/17 132/68   11/30/16 138/78   09/04/15 124/78       Blood pressure is at goal, continue with current medications, follow for recheck in 6 months.    Patient lives in [place] currently, wants to come back in May 2019 for recheck.  Will follow low salt diet, weight loss and regular  exercises.    - amLODIPine (NORVASC) 10 MG tablet; Take 1 tablet (10 mg) by mouth daily  Dispense: 90 tablet; Refill: 2  - lisinopril (PRINIVIL/ZESTRIL) 40 MG tablet; Take 1 tablet (40 mg) by mouth daily  Dispense: 90 tablet; Refill: 2  - metoprolol succinate (TOPROL-XL) 100 MG 24 hr tablet; Take 1 tablet (100 mg) by mouth daily  Dispense: 90 tablet; Refill: 2  - **Comprehensive metabolic panel FUTURE anytime; Future    3. Hyperlipidemia LDL goal <130  LDL Cholesterol Calculated   Date Value Ref Range Status   05/11/2018 76 <100 mg/dL Final     Comment:     Desirable:       <100 mg/dl         LDL is at goal, continue with pravastatin 40 mg daily, healthy eating, regular exercises.  We will get labs at her next visit in May 2019.  - pravastatin (PRAVACHOL) 40 MG tablet; Take 1 tablet (40 mg) by mouth daily  Dispense: 90 tablet; Refill: 3  - Lipid panel reflex to direct LDL Fasting; Future  - **Comprehensive metabolic panel FUTURE anytime; Future    4. Screening for human immunodeficiency virus    - HIV Antigen Antibody Combo    5. Prediabetes  Lab Results   Component Value Date    A1C 5.7 09/14/2018    A1C 5.6 12/07/2016    A1C 5.6 09/04/2015    A1C 5.9 10/24/2014    A1C 6.1 04/11/2014     Glucose   Date Value Ref Range Status   09/14/2018 86 70 - 99 mg/dL Final     Comment:     Fasting specimen   05/11/2018 111 (H) 70 - 99 mg/dL Final     Comment:     Fasting specimen   12/27/2017 103 (H) 70 - 99 mg/dL Final     Comment:     Fasting specimen   12/07/2016 103 (H) 70 - 99 mg/dL Final     Comment:     Fasting specimen   09/04/2015 100 (H) 70 - 99 mg/dL Final     Reviewed normal blood sugar but elevated A1c consistent with prediabetes.  Emphasized on efforts on healthy eating, weight loss and regular exercises, recheck at her next visit  - **A1C FUTURE anytime; Future  - **Comprehensive metabolic panel FUTURE anytime; Future    6. Obesity (BMI 30-39.9)  Wt Readings from Last 5 Encounters:   09/14/18 192 lb 6.4 oz (87.3  kg)   05/11/18 191 lb (86.6 kg)   01/12/18 203 lb 4.8 oz (92.2 kg)   06/02/17 192 lb 4.8 oz (87.2 kg)   11/30/16 189 lb (85.7 kg)     Emphasized on weight loss, portion control, low calorie and low fat diet, healthy eating, regular exercises.      7. CKD (chronic kidney disease) stage 3, GFR 30-59 ml/min  Last Comprehensive Metabolic Panel:  Sodium   Date Value Ref Range Status   09/14/2018 144 133 - 144 mmol/L Final     Potassium   Date Value Ref Range Status   09/14/2018 3.9 3.4 - 5.3 mmol/L Final     Chloride   Date Value Ref Range Status   09/14/2018 110 (H) 94 - 109 mmol/L Final     Carbon Dioxide   Date Value Ref Range Status   09/14/2018 29 20 - 32 mmol/L Final     Anion Gap   Date Value Ref Range Status   09/14/2018 5 3 - 14 mmol/L Final     Glucose   Date Value Ref Range Status   09/14/2018 86 70 - 99 mg/dL Final     Comment:     Fasting specimen     Urea Nitrogen   Date Value Ref Range Status   09/14/2018 26 7 - 30 mg/dL Final     Creatinine   Date Value Ref Range Status   09/14/2018 1.06 (H) 0.52 - 1.04 mg/dL Final     GFR Estimate   Date Value Ref Range Status   09/14/2018 53 (L) >60 mL/min/1.7m2 Final     Comment:     Non  GFR Calc     Calcium   Date Value Ref Range Status   09/14/2018 9.0 8.5 - 10.1 mg/dL Final     BMP lab results reviewed with patient, abnormal but better than last time, continue to hydrate well, continue to control hypertension, continue ACE inhibitor, avoid NSAID use, monitor.  Follow low-salt diet.  - **Comprehensive metabolic panel FUTURE anytime; Future    8. Plantar fasciitis  Reviewed the etio path, gave patient education handouts, start on heel stretches, avoid prolonged standing or walking, use foot orthotics for extra support and recheck in 4weeks or sooner prn.. will consider imaging or night splints for foot if no better by then.        COUNSELING:   Reviewed preventive health counseling, as reflected in patient instructions  Special attention given to:      "   Regular exercise       Healthy diet/nutrition       Vision screening       Immunizations    Vaccinated for: Influenza             Osteoporosis Prevention/Bone Health       HIV screeninx in teen years, 1x in adult years, and at intervals if high risk       (Caroline)menopause management       The 10-year ASCVD risk score (Gloria ANGELES Jr, et al., 2013) is: 3.1%    Values used to calculate the score:      Age: 57 years      Sex: Female      Is Non- : No      Diabetic: No      Tobacco smoker: No      Systolic Blood Pressure: 129 mmHg      Is BP treated: Yes      HDL Cholesterol: 45 mg/dL      Total Cholesterol: 140 mg/dL       Advance Care Planning    BP Readings from Last 1 Encounters:   18 129/77     Estimated body mass index is 30.13 kg/(m^2) as calculated from the following:    Height as of this encounter: 5' 7\" (1.702 m).    Weight as of this encounter: 192 lb 6.4 oz (87.3 kg).      Weight management plan: Discussed healthy diet and exercise guidelines and patient will follow up in 6 months in clinic to re-evaluate.     reports that she has never smoked. She has never used smokeless tobacco.      Counseling Resources:  ATP IV Guidelines  Pooled Cohorts Equation Calculator  Breast Cancer Risk Calculator  FRAX Risk Assessment  ICSI Preventive Guidelines  Dietary Guidelines for Americans, 2010  USDA's MyPlate  ASA Prophylaxis  Lung CA Screening    Laverne Argueta MD  [facility]  Chart documentation done in part with Dragon Voice recognition Software. Although reviewed after completion, some word and grammatical error may remain.    "

## 2018-09-17 LAB — HIV 1+2 AB+HIV1 P24 AG SERPL QL IA: NONREACTIVE

## 2018-09-17 NOTE — PROGRESS NOTES
Dear Jannet,  Your test results for HIV screening is negative, this is good and reassuring.   Let me know if you have any questions. Take care.  Laverne Argueta MD

## 2019-11-01 ENCOUNTER — MYC MEDICAL ADVICE (OUTPATIENT)
Dept: PEDIATRICS | Facility: CLINIC | Age: 59
End: 2019-11-01

## 2019-11-01 DIAGNOSIS — N18.30 BENIGN HYPERTENSION WITH CKD (CHRONIC KIDNEY DISEASE) STAGE III (H): ICD-10-CM

## 2019-11-01 DIAGNOSIS — E78.5 HYPERLIPIDEMIA LDL GOAL <130: ICD-10-CM

## 2019-11-01 DIAGNOSIS — I12.9 BENIGN HYPERTENSION WITH CKD (CHRONIC KIDNEY DISEASE) STAGE III (H): ICD-10-CM

## 2019-11-01 DIAGNOSIS — Z12.39 BREAST CANCER SCREENING: Primary | ICD-10-CM

## 2019-11-05 RX ORDER — AMLODIPINE BESYLATE 10 MG/1
10 TABLET ORAL DAILY
Qty: 90 TABLET | Refills: 0 | Status: SHIPPED | OUTPATIENT
Start: 2019-11-05 | End: 2020-01-13

## 2019-11-05 RX ORDER — PRAVASTATIN SODIUM 40 MG
40 TABLET ORAL DAILY
Qty: 90 TABLET | Refills: 0 | Status: SHIPPED | OUTPATIENT
Start: 2019-11-05 | End: 2020-01-13

## 2019-11-05 RX ORDER — METOPROLOL SUCCINATE 100 MG/1
100 TABLET, EXTENDED RELEASE ORAL DAILY
Qty: 90 TABLET | Refills: 0 | Status: SHIPPED | OUTPATIENT
Start: 2019-11-05 | End: 2020-01-13

## 2019-11-05 RX ORDER — LISINOPRIL 40 MG/1
40 TABLET ORAL DAILY
Qty: 90 TABLET | Refills: 0 | Status: SHIPPED | OUTPATIENT
Start: 2019-11-05 | End: 2020-01-13

## 2019-11-05 NOTE — TELEPHONE ENCOUNTER
90-day supply sent on all medications.  Please inform patient to schedule for physical, mammogram and fasting labs  She is also overdue for her mammogram, last mammogram in 2017

## 2019-12-08 ENCOUNTER — HEALTH MAINTENANCE LETTER (OUTPATIENT)
Age: 59
End: 2019-12-08

## 2020-02-24 DIAGNOSIS — Z13.0 SCREENING FOR DEFICIENCY ANEMIA: ICD-10-CM

## 2020-02-24 DIAGNOSIS — I10 ESSENTIAL HYPERTENSION WITH GOAL BLOOD PRESSURE LESS THAN 140/90: ICD-10-CM

## 2020-02-24 DIAGNOSIS — E78.5 HYPERLIPIDEMIA LDL GOAL <130: ICD-10-CM

## 2020-02-24 DIAGNOSIS — Z00.00 ROUTINE GENERAL MEDICAL EXAMINATION AT A HEALTH CARE FACILITY: Primary | ICD-10-CM

## 2020-02-24 DIAGNOSIS — N18.30 CKD (CHRONIC KIDNEY DISEASE) STAGE 3, GFR 30-59 ML/MIN (H): ICD-10-CM

## 2020-02-24 DIAGNOSIS — Z13.1 SCREENING FOR DIABETES MELLITUS (DM): ICD-10-CM

## 2020-02-24 DIAGNOSIS — R73.03 PREDIABETES: ICD-10-CM

## 2020-03-02 ENCOUNTER — ANCILLARY PROCEDURE (OUTPATIENT)
Dept: MAMMOGRAPHY | Facility: CLINIC | Age: 60
End: 2020-03-02
Attending: FAMILY MEDICINE
Payer: COMMERCIAL

## 2020-03-02 ENCOUNTER — OFFICE VISIT (OUTPATIENT)
Dept: PEDIATRICS | Facility: CLINIC | Age: 60
End: 2020-03-02
Payer: COMMERCIAL

## 2020-03-02 VITALS
DIASTOLIC BLOOD PRESSURE: 87 MMHG | OXYGEN SATURATION: 100 % | TEMPERATURE: 98.1 F | SYSTOLIC BLOOD PRESSURE: 154 MMHG | WEIGHT: 204.9 LBS | HEIGHT: 67 IN | HEART RATE: 71 BPM | BODY MASS INDEX: 32.16 KG/M2

## 2020-03-02 DIAGNOSIS — E78.5 HYPERLIPIDEMIA LDL GOAL <130: ICD-10-CM

## 2020-03-02 DIAGNOSIS — E66.9 OBESITY (BMI 30-39.9): ICD-10-CM

## 2020-03-02 DIAGNOSIS — E78.1 HYPERTRIGLYCERIDEMIA: ICD-10-CM

## 2020-03-02 DIAGNOSIS — Z12.11 COLON CANCER SCREENING: ICD-10-CM

## 2020-03-02 DIAGNOSIS — Z23 NEED FOR SHINGLES VACCINE: ICD-10-CM

## 2020-03-02 DIAGNOSIS — N18.30 CKD (CHRONIC KIDNEY DISEASE) STAGE 3, GFR 30-59 ML/MIN (H): ICD-10-CM

## 2020-03-02 DIAGNOSIS — Z12.4 CERVICAL CANCER SCREENING: ICD-10-CM

## 2020-03-02 DIAGNOSIS — Z13.29 SCREENING FOR THYROID DISORDER: ICD-10-CM

## 2020-03-02 DIAGNOSIS — I10 ESSENTIAL HYPERTENSION WITH GOAL BLOOD PRESSURE LESS THAN 140/90: ICD-10-CM

## 2020-03-02 DIAGNOSIS — Z12.39 BREAST CANCER SCREENING: ICD-10-CM

## 2020-03-02 DIAGNOSIS — Z13.1 SCREENING FOR DIABETES MELLITUS (DM): ICD-10-CM

## 2020-03-02 DIAGNOSIS — N18.30 BENIGN HYPERTENSION WITH CKD (CHRONIC KIDNEY DISEASE) STAGE III (H): ICD-10-CM

## 2020-03-02 DIAGNOSIS — M54.2 NECK PAIN: ICD-10-CM

## 2020-03-02 DIAGNOSIS — R73.03 PREDIABETES: ICD-10-CM

## 2020-03-02 DIAGNOSIS — Z00.00 ROUTINE GENERAL MEDICAL EXAMINATION AT A HEALTH CARE FACILITY: ICD-10-CM

## 2020-03-02 DIAGNOSIS — I12.9 BENIGN HYPERTENSION WITH CKD (CHRONIC KIDNEY DISEASE) STAGE III (H): ICD-10-CM

## 2020-03-02 DIAGNOSIS — Z13.0 SCREENING FOR DEFICIENCY ANEMIA: ICD-10-CM

## 2020-03-02 DIAGNOSIS — Z00.00 ROUTINE GENERAL MEDICAL EXAMINATION AT A HEALTH CARE FACILITY: Primary | ICD-10-CM

## 2020-03-02 LAB
ALBUMIN SERPL-MCNC: 3.9 G/DL (ref 3.4–5)
ALP SERPL-CCNC: 86 U/L (ref 40–150)
ALT SERPL W P-5'-P-CCNC: 32 U/L (ref 0–50)
ANION GAP SERPL CALCULATED.3IONS-SCNC: 7 MMOL/L (ref 3–14)
AST SERPL W P-5'-P-CCNC: 24 U/L (ref 0–45)
BILIRUB SERPL-MCNC: 0.5 MG/DL (ref 0.2–1.3)
BUN SERPL-MCNC: 18 MG/DL (ref 7–30)
CALCIUM SERPL-MCNC: 9.5 MG/DL (ref 8.5–10.1)
CHLORIDE SERPL-SCNC: 106 MMOL/L (ref 94–109)
CHOLEST SERPL-MCNC: 215 MG/DL
CO2 SERPL-SCNC: 27 MMOL/L (ref 20–32)
CREAT SERPL-MCNC: 0.84 MG/DL (ref 0.52–1.04)
CREAT UR-MCNC: 146 MG/DL
ERYTHROCYTE [DISTWIDTH] IN BLOOD BY AUTOMATED COUNT: 12.4 % (ref 10–15)
GFR SERPL CREATININE-BSD FRML MDRD: 76 ML/MIN/{1.73_M2}
GLUCOSE SERPL-MCNC: 115 MG/DL (ref 70–99)
HBA1C MFR BLD: 6 % (ref 0–5.6)
HCT VFR BLD AUTO: 45.2 % (ref 35–47)
HDLC SERPL-MCNC: 43 MG/DL
HGB BLD-MCNC: 14.4 G/DL (ref 11.7–15.7)
LDLC SERPL CALC-MCNC: 112 MG/DL
MCH RBC QN AUTO: 30.4 PG (ref 26.5–33)
MCHC RBC AUTO-ENTMCNC: 31.9 G/DL (ref 31.5–36.5)
MCV RBC AUTO: 95 FL (ref 78–100)
MICROALBUMIN UR-MCNC: 22 MG/L
MICROALBUMIN/CREAT UR: 14.79 MG/G CR (ref 0–25)
NONHDLC SERPL-MCNC: 172 MG/DL
PLATELET # BLD AUTO: 254 10E9/L (ref 150–450)
POTASSIUM SERPL-SCNC: 4 MMOL/L (ref 3.4–5.3)
PROT SERPL-MCNC: 8 G/DL (ref 6.8–8.8)
RBC # BLD AUTO: 4.74 10E12/L (ref 3.8–5.2)
SODIUM SERPL-SCNC: 140 MMOL/L (ref 133–144)
TRIGL SERPL-MCNC: 298 MG/DL
TSH SERPL DL<=0.005 MIU/L-ACNC: 2.65 MU/L (ref 0.4–4)
WBC # BLD AUTO: 8.1 10E9/L (ref 4–11)

## 2020-03-02 PROCEDURE — 84443 ASSAY THYROID STIM HORMONE: CPT | Performed by: FAMILY MEDICINE

## 2020-03-02 PROCEDURE — 80061 LIPID PANEL: CPT | Performed by: FAMILY MEDICINE

## 2020-03-02 PROCEDURE — 99396 PREV VISIT EST AGE 40-64: CPT | Performed by: FAMILY MEDICINE

## 2020-03-02 PROCEDURE — G0145 SCR C/V CYTO,THINLAYER,RESCR: HCPCS | Performed by: FAMILY MEDICINE

## 2020-03-02 PROCEDURE — 83036 HEMOGLOBIN GLYCOSYLATED A1C: CPT | Performed by: FAMILY MEDICINE

## 2020-03-02 PROCEDURE — 36415 COLL VENOUS BLD VENIPUNCTURE: CPT | Performed by: FAMILY MEDICINE

## 2020-03-02 PROCEDURE — 80053 COMPREHEN METABOLIC PANEL: CPT | Performed by: FAMILY MEDICINE

## 2020-03-02 PROCEDURE — 99213 OFFICE O/P EST LOW 20 MIN: CPT | Mod: 25 | Performed by: FAMILY MEDICINE

## 2020-03-02 PROCEDURE — 82043 UR ALBUMIN QUANTITATIVE: CPT | Performed by: FAMILY MEDICINE

## 2020-03-02 PROCEDURE — 77067 SCR MAMMO BI INCL CAD: CPT | Performed by: RADIOLOGY

## 2020-03-02 PROCEDURE — 87624 HPV HI-RISK TYP POOLED RSLT: CPT | Performed by: FAMILY MEDICINE

## 2020-03-02 PROCEDURE — 85027 COMPLETE CBC AUTOMATED: CPT | Performed by: FAMILY MEDICINE

## 2020-03-02 RX ORDER — PRAVASTATIN SODIUM 40 MG
40 TABLET ORAL DAILY
Qty: 90 TABLET | Refills: 1 | Status: SHIPPED | OUTPATIENT
Start: 2020-03-02 | End: 2020-08-21

## 2020-03-02 RX ORDER — METOPROLOL SUCCINATE 100 MG/1
100 TABLET, EXTENDED RELEASE ORAL DAILY
Qty: 90 TABLET | Refills: 1 | Status: SHIPPED | OUTPATIENT
Start: 2020-03-02 | End: 2020-08-21

## 2020-03-02 RX ORDER — LISINOPRIL 40 MG/1
40 TABLET ORAL DAILY
Qty: 90 TABLET | Refills: 1 | Status: SHIPPED | OUTPATIENT
Start: 2020-03-02 | End: 2020-08-21

## 2020-03-02 RX ORDER — AMLODIPINE BESYLATE 10 MG/1
10 TABLET ORAL DAILY
Qty: 90 TABLET | Refills: 1 | Status: SHIPPED | OUTPATIENT
Start: 2020-03-02 | End: 2020-08-21

## 2020-03-02 ASSESSMENT — MIFFLIN-ST. JEOR: SCORE: 1532.05

## 2020-03-02 ASSESSMENT — PAIN SCALES - GENERAL: PAINLEVEL: SEVERE PAIN (7)

## 2020-03-02 NOTE — PROGRESS NOTES
SUBJECTIVE:   CC: Jannet Love is an 59 year old woman who presents for preventive health visit.     Healthy Habits:     Do you get at least three servings of calcium containing foods daily (dairy, green leafy vegetables, etc.)? no, taking calcium and/or vitamin D supplement: yes - 1    Amount of exercise or daily activities, outside of work: 0 day(s) per week    Problems taking medications regularly No    Medication side effects: No    Have you had an eye exam in the past two years? yes    Do you see a dentist twice per year? yes    Do you have sleep apnea, excessive snoring or daytime drowsiness?no    QUESTIONS/ CONCERNS:   1. Neck pain for several weeks, only when waking in the morning,    Hyperlipidemia Follow-Up    Are you regularly taking any medication or supplement to lower your cholesterol?   Yes- Pravastatin    Are you having muscle aches or other side effects that you think could be caused by your cholesterol lowering medication?  No    Hypertension Follow-up    Do you check your blood pressure regularly outside of the clinic? No     Are you following a low salt diet? Sometimes    Are your blood pressures ever more than 140 on the top number (systolic) OR more   than 90 on the bottom number (diastolic), for example 140/90? No    Chronic Kidney Disease Follow-up  Do you take any over the counter pain medicine?: Yes  What over the counter medicine are you taking for your pain?:  Tylenol      How often do you take this medicine?:  PRN          Today's PHQ-2 Score:   PHQ-2 ( 1999 [employer]) 3/2/2020 3/2/2020   Q1: Little interest or pleasure in doing things 0 0   Q2: Feeling down, depressed or hopeless 0 1   PHQ-2 Score 0 1   Q1: Little interest or pleasure in doing things - -   Q2: Feeling down, depressed or hopeless - -   PHQ-2 Score - -     Abuse: Current or Past(Physical, Sexual or Emotional)- No  Do you feel safe in your environment? Yes    Have you ever done Advance Care Planning? (For example, jo ann  Health Directive, POLST, or a discussion with a medical provider or your loved ones about your wishes): No, advance care planning information given to patient to review.  Patient plans to discuss their wishes with loved ones or provider.      Social History     Tobacco Use     Smoking status: Never Smoker     Smokeless tobacco: Never Used   Substance Use Topics     Alcohol use: Yes     Comment: ocassionally     If you drink alcohol do you typically have >3 drinks per day or >7 drinks per week? No                     Reviewed orders with patient.  Reviewed health maintenance and updated orders accordingly - Yes  Lab work is in process  Labs reviewed in EPIC  BP Readings from Last 3 Encounters:   03/02/20 (!) 154/87   09/14/18 129/77   05/11/18 124/82    Wt Readings from Last 3 Encounters:   03/02/20 92.9 kg (204 lb 14.4 oz)   09/14/18 87.3 kg (192 lb 6.4 oz)   05/11/18 86.6 kg (191 lb)                  Patient Active Problem List   Diagnosis     Excessive or frequent menstruation     Surveillance of previously prescribed intrauterine contraceptive device     Endometrial hyperplasia     Benign hypertension with CKD (chronic kidney disease) stage III (H)     Dyslipidemia     Dermatofibroma of lower extremity     Prediabetes     Hypertriglyceridemia     CKD (chronic kidney disease) stage 3, GFR 30-59 ml/min (H)     Essential hypertension with goal blood pressure less than 140/90     Obesity (BMI 30-39.9)     Plantar fasciitis     Past Surgical History:   Procedure Laterality Date     C NONSPECIFIC PROCEDURE  2000    Hysteroscopy with deep endometrial biopsy. Endocervical curettage.     C NONSPECIFIC PROCEDURE  1984    brachial cleft cyst repair     HEAD & NECK SURGERY         Social History     Tobacco Use     Smoking status: Never Smoker     Smokeless tobacco: Never Used   Substance Use Topics     Alcohol use: Yes     Comment: ocassionally     Family History   Problem Relation Age of Onset     Hypertension Mother       C.A.D. Mother         MI     C.A.D. Father         MI     Cardiovascular Father 49        MI, pacemaker in 2009     Heart Disease Father      Prostate Cancer Father      C.A.D. Brother         MI at age 41     Cardiovascular Brother      Heart Disease Brother      C.A.D. Maternal Grandfather      Diabetes Maternal Grandmother      Gastrointestinal Disease Daughter 11        Crohns Disease     Breast Cancer Paternal Grandmother      Alcohol/Drug Paternal Grandfather          Current Outpatient Medications   Medication Sig Dispense Refill     amLODIPine (NORVASC) 10 MG tablet Take 1 tablet (10 mg) by mouth daily 90 tablet 1     ASPIRIN 81 MG OR TABS ONE DAILY 100 3     Calcium-Vitamin D 600-200 MG-UNIT TABS Take 1 tablet by mouth daily        fish oil-omega-3 fatty acids (FISH OIL) 1000 MG capsule take 2 g by mouth daily.       lisinopril (ZESTRIL) 40 MG tablet Take 1 tablet (40 mg) by mouth daily 90 tablet 1     metoprolol succinate ER (TOPROL-XL) 100 MG 24 hr tablet Take 1 tablet (100 mg) by mouth daily 90 tablet 1     pravastatin (PRAVACHOL) 40 MG tablet Take 1 tablet (40 mg) by mouth daily 90 tablet 1     Allergies   Allergen Reactions     No Known Drug Allergies      Recent Labs   Lab Test 03/02/20  0747 09/14/18  0958 05/11/18  0925 12/27/17  0739 12/07/16  0716   A1C 6.0* 5.7*  --   --  5.6   *  --  76 164* 133*   HDL 43*  --  45* 47* 46*   TRIG 298*  --  97 231* 85   ALT 32 27  --   --  26   CR 0.84 1.06* 1.27* 0.81 0.92   GFRESTIMATED 76 53* 43* 72 63   GFRESTBLACK 88 64 52* 87 76   POTASSIUM 4.0 3.9 4.2 4.0 4.4   TSH 2.65 1.84  --   --   --         Mammogram Screening: Patient over age 50, mutual decision to screen reflected in health maintenance.    Pertinent mammograms are reviewed under the imaging tab.  History of abnormal Pap smear: NO - age 30-65 PAP every 5 years with negative HPV co-testing recommended  PAP / HPV Latest Ref Rng & Units 6/2/2017 3/22/2013 9/17/2010   PAP - NIL NIL NIL   HPV 16  DNA NEG Negative - -   HPV 18 DNA NEG Negative - -   OTHER HR HPV NEG Negative - -     Reviewed and updated as needed this visit by clinical staff  Tobacco  Allergies  Meds  Med Hx  Surg Hx  Fam Hx  Soc Hx        Reviewed and updated as needed this visit by Provider          Past Medical History:   Diagnosis Date     Bronchitis, not specified as acute or chronic      EXCESSIVE MENSTRUATION 2007    Mirena IUD placed for simple hyperplasia     INSERTION OF IUD 10/3/2007    Mirena IUD  For simple hyperplasia of the endometrium     Mixed hyperlipidemia      NONSPECIFIC MEDICAL HISTORY     cough     Unspecified essential hypertension       Past Surgical History:   Procedure Laterality Date     C NONSPECIFIC PROCEDURE      Hysteroscopy with deep endometrial biopsy. Endocervical curettage.     C NONSPECIFIC PROCEDURE      brachial cleft cyst repair     HEAD & NECK SURGERY       OB History    Para Term  AB Living   3 2 2 0 1 2   SAB TAB Ectopic Multiple Live Births   1 0 0 0 2      # Outcome Date GA Lbr Scar/2nd Weight Sex Delivery Anes PTL Lv   3 Term 86 40w0d   M    RALPH      Birth Comments: high bp      Name: Mars   2 SAB 85     SAB   DEC   1 Term 83 40w0d   F    RALPH      Birth Comments: High bp      Name: Tonya       ROS:  CONSTITUTIONAL: NEGATIVE for fever, chills, change in weight  INTEGUMENTARY/SKIN: NEGATIVE for worrisome rashes, moles or lesions  EYES: NEGATIVE for vision changes or irritation  ENT: NEGATIVE for ear, mouth and throat problems  RESP: NEGATIVE for significant cough or SOB  BREAST: NEGATIVE for masses, tenderness or discharge  CV: NEGATIVE for chest pain, palpitations or peripheral edema  CV: Hx HTN  GI: NEGATIVE for nausea, abdominal pain, heartburn, or change in bowel habits  : NEGATIVE for unusual urinary or vaginal symptoms. No vaginal bleeding.  MUSCULOSKELETAL: Intermittent mild neck pain with no history of headaches, trauma, fall,  "upper extremity focal neurological symptoms, neck stiffness for the past few months  NEURO: NEGATIVE for weakness, dizziness or paresthesias  ENDOCRINE: NEGATIVE for temperature intolerance, skin/hair changes  HEME/ALLERGY/IMMUNE: NEGATIVE for bleeding problems  PSYCHIATRIC: NEGATIVE for changes in mood or affect     OBJECTIVE:   BP (!) 154/87 (BP Location: Right arm, Patient Position: Sitting, Cuff Size: Adult Large)   Pulse 71   Temp 98.1  F (36.7  C) (Oral)   Ht 1.702 m (5' 7\")   Wt 92.9 kg (204 lb 14.4 oz)   LMP 08/26/2007   SpO2 100%   BMI 32.09 kg/m    EXAM:  GENERAL APPEARANCE: healthy, alert and no distress  EYES: Eyes grossly normal to inspection, PERRL and conjunctivae and sclerae normal  HENT: ear canals and TM's normal, nose and mouth without ulcers or lesions, oropharynx clear and oral mucous membranes moist  NECK: no adenopathy, no asymmetry, masses, or scars and thyroid normal to palpation  RESP: lungs clear to auscultation - no rales, rhonchi or wheezes  BREAST: normal without masses, tenderness or nipple discharge and no palpable axillary masses or adenopathy  CV: regular rate and rhythm, normal S1 S2, no S3 or S4, no murmur, click or rub, no peripheral edema and peripheral pulses strong  ABDOMEN: soft, nontender, no hepatosplenomegaly, no masses and bowel sounds normal   (female): normal female external genitalia, normal urethral meatus, vaginal mucosal atrophy noted, normal cervix, adnexae, and uterus without masses or abnormal discharge  MS: no musculoskeletal defects are noted and gait is age appropriate without ataxia  MS: Normal neck exam with good range of motion, no cervical spine tenderness, no muscle spasm  SKIN: no suspicious lesions or rashes  NEURO: Normal strength and tone, sensory exam grossly normal, mentation intact and speech normal  PSYCH: mentation appears normal and affect normal/bright    Diagnostic Test Results:  Labs reviewed in Epic  Results for orders placed or " performed in visit on 03/02/20 (from the past 24 hour(s))   TSH with free T4 reflex   Result Value Ref Range    TSH 2.65 0.40 - 4.00 mU/L       ASSESSMENT/PLAN:   1. Routine general medical examination at a health care facility  Discussed on regular exercises, daily calcium intake, healthy eating, self breast exams monthly and routine dental checks    - TSH with free T4 reflex  - Pap imaged thin layer screen with HPV - recommended age 30 - 65 years (select HPV order below)  - HPV High Risk Types DNA Cervical    2. Neck pain  ddx-neck muscle strain versus muscle spasm.  Consider physical therapy, muscle relaxers, patient declined.  Recommended  local ice and heat, avoid triggering activities, tylenol  prn for pain and rtc ofr persistent or worsening concerns in 4weeks.      3. Benign hypertension with CKD (chronic kidney disease) stage III (H)  BP Readings from Last 6 Encounters:   03/02/20 (!) 154/87   09/14/18 129/77   05/11/18 124/82   01/12/18 186/86   06/02/17 132/68   11/30/16 138/78     Blood pressure is slightly elevated, patient does not think she has been taking all the medications as prescribed due to recent traveling  Will follow low salt diet, weight loss and regular exercises.  Start back on medications, follow-up for recheck in 3 months along with fasting labs as mentioned below  - lisinopril (ZESTRIL) 40 MG tablet; Take 1 tablet (40 mg) by mouth daily  Dispense: 90 tablet; Refill: 1  - metoprolol succinate ER (TOPROL-XL) 100 MG 24 hr tablet; Take 1 tablet (100 mg) by mouth daily  Dispense: 90 tablet; Refill: 1  - amLODIPine (NORVASC) 10 MG tablet; Take 1 tablet (10 mg) by mouth daily  Dispense: 90 tablet; Refill: 1    4. Hyperlipidemia LDL goal <130  LDL Cholesterol Calculated   Date Value Ref Range Status   03/02/2020 112 (H) <100 mg/dL Final     Comment:     Above desirable:  100-129 mg/dl  Borderline High:  130-159 mg/dL  High:             160-189 mg/dL  Very high:       >189 mg/dl        Triglycerides   Date Value Ref Range Status   03/02/2020 298 (H) <150 mg/dL Final     Comment:     Borderline high:  150-199 mg/dl  High:             200-499 mg/dl  Very high:       >499 mg/dl  Fasting specimen       Reviewed moderately elevated triglycerides, and changing cholesterol numbers  Patient reported being noncompliant in taking medications.  We will continue with current dose until her recheck appointment in 3 months  Consider dose adjustment  for persistent or worsening concerns  Patient verbalised understanding and is agreeable to the plan.      - pravastatin (PRAVACHOL) 40 MG tablet; Take 1 tablet (40 mg) by mouth daily  Dispense: 90 tablet; Refill: 1  - Lipid panel reflex to direct LDL Fasting; Future  - TSH with free T4 reflex    5. Hypertriglyceridemia  as above    - Lipid panel reflex to direct LDL Fasting; Future  - TSH with free T4 reflex    6. Prediabetes  Lab Results   Component Value Date    A1C 6.0 03/02/2020    A1C 5.7 09/14/2018    A1C 5.6 12/07/2016    A1C 5.6 09/04/2015    A1C 5.9 10/24/2014     Glucose   Date Value Ref Range Status   03/02/2020 115 (H) 70 - 99 mg/dL Final     Comment:     Fasting specimen   09/14/2018 86 70 - 99 mg/dL Final     Comment:     Fasting specimen   05/11/2018 111 (H) 70 - 99 mg/dL Final     Comment:     Fasting specimen   12/27/2017 103 (H) 70 - 99 mg/dL Final     Comment:     Fasting specimen   12/07/2016 103 (H) 70 - 99 mg/dL Final     Comment:     Fasting specimen     Noted elevated fasting blood sugar and moderate elevated A1c which is slightly worse from before  Emphasized on weight loss, portion control, low calorie and low fat diet, healthy eating, regular exercises. Offered dietary consult, declined. Encouraged to enroll in a weight loss program for tracking on meal planning and weight.  Recheck in 3 months  - **A1C FUTURE 3mo; Future    7. Screening for thyroid disorder    - TSH with free T4 reflex    8. Breast cancer screening  Patient just had a  mammogram today    9. Cervical cancer screening    - Pap imaged thin layer screen with HPV - recommended age 30 - 65 years (select HPV order below)  - HPV High Risk Types DNA Cervical    10. Colon cancer screening  Is due for colonoscopy next year    11. Need for shingles vaccine  Recommended  patient to have it done at the pharmacy after checking with insurance for coverage.      12. CKD (chronic kidney disease) stage 3, GFR 30-59 ml/min (H)  Last Comprehensive Metabolic Panel:  Sodium   Date Value Ref Range Status   03/02/2020 140 133 - 144 mmol/L Final     Potassium   Date Value Ref Range Status   03/02/2020 4.0 3.4 - 5.3 mmol/L Final     Chloride   Date Value Ref Range Status   03/02/2020 106 94 - 109 mmol/L Final     Carbon Dioxide   Date Value Ref Range Status   03/02/2020 27 20 - 32 mmol/L Final     Anion Gap   Date Value Ref Range Status   03/02/2020 7 3 - 14 mmol/L Final     Glucose   Date Value Ref Range Status   03/02/2020 115 (H) 70 - 99 mg/dL Final     Comment:     Fasting specimen     Urea Nitrogen   Date Value Ref Range Status   03/02/2020 18 7 - 30 mg/dL Final     Creatinine   Date Value Ref Range Status   03/02/2020 0.84 0.52 - 1.04 mg/dL Final     GFR Estimate   Date Value Ref Range Status   03/02/2020 76 >60 mL/min/[1.73_m2] Final     Comment:     Non  GFR Calc  Starting 12/18/2018, serum creatinine based estimated GFR (eGFR) will be   calculated using the Chronic Kidney Disease Epidemiology Collaboration   (CKD-EPI) equation.       Calcium   Date Value Ref Range Status   03/02/2020 9.5 8.5 - 10.1 mg/dL Final     Reviewed normal kidney functions from today, continue with good hydration, control high blood pressure, avoid NSAID use    13. Obesity (BMI 30-39.9)  Wt Readings from Last 5 Encounters:   03/02/20 92.9 kg (204 lb 14.4 oz)   09/14/18 87.3 kg (192 lb 6.4 oz)   05/11/18 86.6 kg (191 lb)   01/12/18 92.2 kg (203 lb 4.8 oz)   06/02/17 87.2 kg (192 lb 4.8 oz)     Emphasized on  "weight loss, portion control, low calorie and low fat diet, healthy eating, regular exercises. Offered dietary consult, declined. Encouraged to enroll in a weight loss program for tracking on meal planning and weight.        COUNSELING:   Reviewed preventive health counseling, as reflected in patient instructions  Special attention given to:        Regular exercise       Healthy diet/nutrition       Vision screening       Immunizations    Declined: Influenza due to Other                Osteoporosis Prevention/Bone Health       (Caroline)menopause management       The 10-year ASCVD risk score (Gloria ANGELES Jr., et al., 2013) is: 7.9%    Values used to calculate the score:      Age: 59 years      Sex: Female      Is Non- : No      Diabetic: No      Tobacco smoker: No      Systolic Blood Pressure: 154 mmHg      Is BP treated: Yes      HDL Cholesterol: 43 mg/dL      Total Cholesterol: 215 mg/dL       Advance Care Planning    Estimated body mass index is 32.09 kg/m  as calculated from the following:    Height as of this encounter: 1.702 m (5' 7\").    Weight as of this encounter: 92.9 kg (204 lb 14.4 oz).    Weight management plan: Discussed healthy diet and exercise guidelines     reports that she has never smoked. She has never used smokeless tobacco.      Counseling Resources:  ATP IV Guidelines  Pooled Cohorts Equation Calculator  Breast Cancer Risk Calculator  FRAX Risk Assessment  ICSI Preventive Guidelines  Dietary Guidelines for Americans, 2010  USDA's MyPlate  ASA Prophylaxis  Lung CA Screening    Laverne Argueta MD  [facility]  Chart documentation done in part with Dragon Voice recognition Software. Although reviewed after completion, some word and grammatical error may remain.    "

## 2020-03-02 NOTE — RESULT ENCOUNTER NOTE
Dear Jannet,  Your mammogram is negative and reassuring.   Let me know if you have any questions. Take care.  Laverne Argueta MD

## 2020-03-02 NOTE — LETTER
March 9, 2020    Jannet Love  [id number] [address] [id number]    Dear ,  This letter is regarding your recent Pap smear (cervical cancer screening) and Human Papillomavirus (HPV) test.  We are happy to inform you that your Pap smear result is normal. Cervical cancer is closely linked with certain types of HPV. Your results showed no evidence of high-risk HPV.  We recommend you have your next PAP smear and HPV test in 5 years.  You will still need to return to the clinic every year for an annual exam and other preventive tests.  If you have additional questions regarding this result, please call our registered nurse, Katerina at [phone].  Sincerely,    Laverne Argueta MD/madison

## 2020-03-02 NOTE — PATIENT INSTRUCTIONS
Schedule for fasting labs and recheck in 3 months  Start back on omega 3 fish oil, twice daily      Preventive Health Recommendations  Female Ages 50 - 64    Yearly exam: See your health care provider every year in order to  o Review health changes.   o Discuss preventive care.    o Review your medicines if your doctor has prescribed any.      Get a Pap test every three years (unless you have an abnormal result and your provider advises testing more often).    If you get Pap tests with HPV test, you only need to test every 5 years, unless you have an abnormal result.     You do not need a Pap test if your uterus was removed (hysterectomy) and you have not had cancer.    You should be tested each year for STDs (sexually transmitted diseases) if you're at risk.     Have a mammogram every 1 to 2 years.    Have a colonoscopy at age 50, or have a yearly FIT test (stool test). These exams screen for colon cancer.      Have a cholesterol test every 5 years, or more often if advised.    Have a diabetes test (fasting glucose) every three years. If you are at risk for diabetes, you should have this test more often.     If you are at risk for osteoporosis (brittle bone disease), think about having a bone density scan (DEXA).    Shots: Get a flu shot each year. Get a tetanus shot every 10 years.    Nutrition:     Eat at least 5 servings of fruits and vegetables each day.    Eat whole-grain bread, whole-wheat pasta and brown rice instead of white grains and rice.    Get adequate Calcium and Vitamin D.     Lifestyle    Exercise at least 150 minutes a week (30 minutes a day, 5 days a week). This will help you control your weight and prevent disease.    Limit alcohol to one drink per day.    No smoking.     Wear sunscreen to prevent skin cancer.     See your dentist every six months for an exam and cleaning.    See your eye doctor every 1 to 2 years.

## 2020-03-05 LAB
COPATH REPORT: NORMAL
PAP: NORMAL

## 2020-03-06 LAB
FINAL DIAGNOSIS: NORMAL
HPV HR 12 DNA CVX QL NAA+PROBE: NEGATIVE
HPV16 DNA SPEC QL NAA+PROBE: NEGATIVE
HPV18 DNA SPEC QL NAA+PROBE: NEGATIVE
SPECIMEN DESCRIPTION: NORMAL
SPECIMEN SOURCE CVX/VAG CYTO: NORMAL

## 2020-08-20 NOTE — PROGRESS NOTES
Subjective     Jannet Love is a 59 year old female who presents to clinic today for the following health issues:    HPI       Hyperlipidemia Follow-Up      Are you regularly taking any medication or supplement to lower your cholesterol?   Yes- Pravastatin    Are you having muscle aches or other side effects that you think could be caused by your cholesterol lowering medication?  No    Hypertension Follow-up      Do you check your blood pressure regularly outside of the clinic? No     Are you following a low salt diet? Yes    Are your blood pressures ever more than 140 on the top number (systolic) OR more   than 90 on the bottom number (diastolic), for example 140/90? No unknown      How many servings of fruits and vegetables do you eat daily?  2-3    On average, how many sweetened beverages do you drink each day (Examples: soda, juice, sweet tea, etc.  Do NOT count diet or artificially sweetened beverages)?   0    How many days per week do you exercise enough to make your heart beat faster? 3 or less    How many minutes a day do you exercise enough to make your heart beat faster? 30 - 60  How many days per week do you miss taking your medication? 1    What makes it hard for you to take your medications?  remembering to take    Insomnia  Onset/Duration: 1 year, with recent worsening in the past 6 months  Description:   Frequency of insomnia:  nightly  Time to fall asleep (sleep latency): 45 minutes  Middle of night awakening:  YES  Early morning awakening:  no  Progression of Symptoms:  worsening  Accompanying Signs & Symptoms:  Daytime sleepiness/napping: no  Excessive snoring/apnea: no  Restless legs: no  Waking to urinate: no  Chronic pain:  no  Depression symptoms (if yes, do PHQ9): no  Anxiety symptoms (if yes, do ROSALINO-7): no  History:  Prior Insomnia: no  New stressful situation: Currently feeling anxious with a COVID pandemic, her family's health  Precipitating factors:   Caffeine intake: no  OTC  decongestants: no  Any new medications: no  Alleviating factors:  Self medicating (alcohol, etc.):  no  Stress-reduction (exercise, yoga, meditation etc): no  Therapies tried and outcome: none      Review of Systems   CONSTITUTIONAL: NEGATIVE for fever, chills, change in weight  INTEGUMENTARY/SKIN: NEGATIVE for worrisome rashes, moles or lesions  EYES: NEGATIVE for vision changes or irritation  RESP: NEGATIVE for significant cough or SOB  CV: NEGATIVE for chest pain, palpitations or peripheral edema  CV: Hx HTN  GI: NEGATIVE for nausea, abdominal pain, heartburn, or change in bowel habits  MUSCULOSKELETAL: NEGATIVE for significant arthralgias or myalgia  NEURO: NEGATIVE for weakness, dizziness or paresthesias  ENDOCRINE: NEGATIVE for temperature intolerance, skin/hair changes  HEME/ALLERGY/IMMUNE: NEGATIVE for bleeding problems  PSYCHIATRIC: NEGATIVE for changes in mood or affect      Objective    BP (!) 140/70   Pulse 55   Temp 96.8  F (36  C) (Temporal)   Wt 83 kg (183 lb)   LMP 08/26/2007   SpO2 98%   BMI 28.66 kg/m    Body mass index is 28.66 kg/m .  Physical Exam   GENERAL: healthy, alert and no distress  RESP: lungs clear to auscultation - no rales, rhonchi or wheezes  CV: regular rate and rhythm, normal S1 S2, no S3 or S4, no murmur, click or rub, no peripheral edema and peripheral pulses strong  MS: no gross musculoskeletal defects noted, no edema  NEURO: Normal strength and tone, mentation intact and speech normal  PSYCH: mentation appears normal, affect normal/bright    Results for orders placed or performed in visit on 08/21/20 (from the past 24 hour(s))   **A1C FUTURE 3mo   Result Value Ref Range    Hemoglobin A1C 5.5 0 - 5.6 %   Lipid panel reflex to direct LDL Fasting   Result Value Ref Range    Cholesterol 162 <200 mg/dL    Triglycerides 141 <150 mg/dL    HDL Cholesterol 45 (L) >49 mg/dL    LDL Cholesterol Calculated 89 <100 mg/dL    Non HDL Cholesterol 117 <130 mg/dL           Assessment & Plan      Hyperlipidemia LDL goal <130  LDL Cholesterol Calculated   Date Value Ref Range Status   08/21/2020 89 <100 mg/dL Final     Comment:     Desirable:       <100 mg/dl     LDL is at goal, continue with pravastatin 40 mg daily, regular exercises, healthy eating  Recheck in 6 months at the time of physical or sooner if needed  - pravastatin (PRAVACHOL) 40 MG tablet; Take 1 tablet (40 mg) by mouth daily    Benign hypertension with CKD (chronic kidney disease) stage III (H)  BP Readings from Last 6 Encounters:   08/21/20 (!) 140/70   03/02/20 (!) 154/87   09/14/18 129/77   05/11/18 124/82   01/12/18 186/86   06/02/17 132/68     Initial blood pressure was 146/81, rechecked-140/70  We will continue with current medications  Recommended patient to start taking blood pressures once a day at home, send a blood pressure readings through my chart in 2 weeks  Appreciated patient's efforts on weight loss, continue with healthy eating, portion control, regular exercises, low-salt diet  Patient verbalised understanding and is agreeable to the plan.    - metoprolol succinate ER (TOPROL-XL) 100 MG 24 hr tablet; Take 1 tablet (100 mg) by mouth daily  - lisinopril (ZESTRIL) 40 MG tablet; Take 1 tablet (40 mg) by mouth daily  - amLODIPine (NORVASC) 10 MG tablet; Take 1 tablet (10 mg) by mouth daily    Adjustment insomnia  Reviewed sleep hygiene  Prescription given for trazodone to take 25 to 50 mg as needed for sleep  Patient will send a therapeutic response update in 4 weeks through my chart  Dosing and potential medication side effects discussed.  Patient verbalised understanding and is agreeable to the plan.    - traZODone (DESYREL) 50 MG tablet; Take 0.5-1 tablets (25-50 mg) by mouth nightly as needed for sleep    Prediabetes  Lab Results   Component Value Date    A1C 5.5 08/21/2020    A1C 6.0 03/02/2020    A1C 5.7 09/14/2018    A1C 5.6 12/07/2016    A1C 5.6 09/04/2015     Glucose   Date Value Ref Range Status   03/02/2020 115 (H)  "70 - 99 mg/dL Final     Comment:     Fasting specimen   09/14/2018 86 70 - 99 mg/dL Final     Comment:     Fasting specimen   05/11/2018 111 (H) 70 - 99 mg/dL Final     Comment:     Fasting specimen   12/27/2017 103 (H) 70 - 99 mg/dL Final     Comment:     Fasting specimen   12/07/2016 103 (H) 70 - 99 mg/dL Final     Comment:     Fasting specimen     Reviewed normal A1c with significant improvement from before likely from her moderate weight loss of 20 pounds in the past 5 months  Appreciated patient's efforts on healthy eating, portion control, regular exercises, will continue       BMI:   Estimated body mass index is 28.66 kg/m  as calculated from the following:    Height as of 3/2/20: 1.702 m (5' 7\").    Weight as of this encounter: 83 kg (183 lb).   Weight management plan: Discussed healthy diet and exercise guidelines        Work on weight loss  Regular exercise  Chart documentation done in part with Dragon Voice recognition Software. Although reviewed after completion, some word and grammatical error may remain.    See Patient Instructions    Return in about 7 months (around 3/21/2021) for Physical Exam, mammogram, fasting labs.    Laverne Argueta MD  [facility]    "

## 2020-08-21 ENCOUNTER — OFFICE VISIT (OUTPATIENT)
Dept: PEDIATRICS | Facility: CLINIC | Age: 60
End: 2020-08-21
Payer: COMMERCIAL

## 2020-08-21 VITALS
BODY MASS INDEX: 28.66 KG/M2 | WEIGHT: 183 LBS | HEART RATE: 55 BPM | OXYGEN SATURATION: 98 % | TEMPERATURE: 96.8 F | DIASTOLIC BLOOD PRESSURE: 70 MMHG | SYSTOLIC BLOOD PRESSURE: 140 MMHG

## 2020-08-21 DIAGNOSIS — N18.30 BENIGN HYPERTENSION WITH CKD (CHRONIC KIDNEY DISEASE) STAGE III (H): ICD-10-CM

## 2020-08-21 DIAGNOSIS — E78.5 HYPERLIPIDEMIA LDL GOAL <130: Primary | ICD-10-CM

## 2020-08-21 DIAGNOSIS — R73.03 PREDIABETES: ICD-10-CM

## 2020-08-21 DIAGNOSIS — E78.1 HYPERTRIGLYCERIDEMIA: ICD-10-CM

## 2020-08-21 DIAGNOSIS — I12.9 BENIGN HYPERTENSION WITH CKD (CHRONIC KIDNEY DISEASE) STAGE III (H): ICD-10-CM

## 2020-08-21 DIAGNOSIS — F51.02 ADJUSTMENT INSOMNIA: ICD-10-CM

## 2020-08-21 DIAGNOSIS — E78.5 HYPERLIPIDEMIA LDL GOAL <130: ICD-10-CM

## 2020-08-21 LAB
CHOLEST SERPL-MCNC: 162 MG/DL
HBA1C MFR BLD: 5.5 % (ref 0–5.6)
HDLC SERPL-MCNC: 45 MG/DL
LDLC SERPL CALC-MCNC: 89 MG/DL
NONHDLC SERPL-MCNC: 117 MG/DL
TRIGL SERPL-MCNC: 141 MG/DL

## 2020-08-21 PROCEDURE — 83036 HEMOGLOBIN GLYCOSYLATED A1C: CPT | Performed by: FAMILY MEDICINE

## 2020-08-21 PROCEDURE — 36415 COLL VENOUS BLD VENIPUNCTURE: CPT | Performed by: FAMILY MEDICINE

## 2020-08-21 PROCEDURE — 80061 LIPID PANEL: CPT | Performed by: FAMILY MEDICINE

## 2020-08-21 PROCEDURE — 99214 OFFICE O/P EST MOD 30 MIN: CPT | Performed by: FAMILY MEDICINE

## 2020-08-21 RX ORDER — PRAVASTATIN SODIUM 40 MG
40 TABLET ORAL DAILY
Qty: 90 TABLET | Refills: 3 | Status: SHIPPED | OUTPATIENT
Start: 2020-08-21 | End: 2021-06-11

## 2020-08-21 RX ORDER — TRAZODONE HYDROCHLORIDE 50 MG/1
25-50 TABLET, FILM COATED ORAL
Qty: 90 TABLET | Refills: 3 | Status: SHIPPED | OUTPATIENT
Start: 2020-08-21 | End: 2021-06-11

## 2020-08-21 RX ORDER — LISINOPRIL 40 MG/1
40 TABLET ORAL DAILY
Qty: 90 TABLET | Refills: 1 | Status: SHIPPED | OUTPATIENT
Start: 2020-08-21 | End: 2021-01-20

## 2020-08-21 RX ORDER — METOPROLOL SUCCINATE 100 MG/1
100 TABLET, EXTENDED RELEASE ORAL DAILY
Qty: 90 TABLET | Refills: 1 | Status: SHIPPED | OUTPATIENT
Start: 2020-08-21 | End: 2021-01-20

## 2020-08-21 RX ORDER — AMLODIPINE BESYLATE 10 MG/1
10 TABLET ORAL DAILY
Qty: 90 TABLET | Refills: 1 | Status: SHIPPED | OUTPATIENT
Start: 2020-08-21 | End: 2021-01-20

## 2020-08-21 NOTE — PATIENT INSTRUCTIONS
Start on TRAZODONE 25-50 mg as needed for sleep at bedtime  Schedule for physical, fasting labs in 7 months  Start checking BP at home

## 2021-01-19 DIAGNOSIS — N18.30 BENIGN HYPERTENSION WITH CKD (CHRONIC KIDNEY DISEASE) STAGE III (H): ICD-10-CM

## 2021-01-19 DIAGNOSIS — I12.9 BENIGN HYPERTENSION WITH CKD (CHRONIC KIDNEY DISEASE) STAGE III (H): ICD-10-CM

## 2021-01-20 RX ORDER — AMLODIPINE BESYLATE 10 MG/1
TABLET ORAL
Qty: 90 TABLET | Refills: 0 | Status: SHIPPED | OUTPATIENT
Start: 2021-01-20 | End: 2021-04-12

## 2021-01-20 RX ORDER — METOPROLOL SUCCINATE 100 MG/1
TABLET, EXTENDED RELEASE ORAL
Qty: 90 TABLET | Refills: 0 | Status: SHIPPED | OUTPATIENT
Start: 2021-01-20 | End: 2021-04-12

## 2021-01-20 RX ORDER — LISINOPRIL 40 MG/1
TABLET ORAL
Qty: 90 TABLET | Refills: 0 | Status: SHIPPED | OUTPATIENT
Start: 2021-01-20 | End: 2021-04-12

## 2021-01-20 NOTE — TELEPHONE ENCOUNTER
"Requested Prescriptions   Pending Prescriptions Disp Refills    amLODIPine (NORVASC) 10 MG tablet [Pharmacy Med Name: AMLODIPINE  10MG  TAB] 90 tablet 3     Sig: TAKE 1 TABLET BY MOUTH  DAILY       Calcium Channel Blockers Protocol  Failed - 1/19/2021  9:10 PM        Failed - Blood pressure under 140/90 in past 12 months     BP Readings from Last 3 Encounters:   08/21/20 (!) 140/70   03/02/20 (!) 154/87 09/14/18 129/77                 Failed - Recent (12 mo) or future (30 days) visit within the authorizing provider's specialty     Patient has had an office visit with the authorizing provider or a provider within the authorizing providers department within the previous 12 mos or has a future within next 30 days. See \"Patient Info\" tab in inbasket, or \"Choose Columns\" in Meds & Orders section of the refill encounter.              Passed - Medication is active on med list        Passed - Patient is age 18 or older        Passed - No active pregnancy on record        Passed - Normal serum creatinine on file in past 12 months     Recent Labs   Lab Test 03/02/20  0747   CR 0.84       Ok to refill medication if creatinine is low          Passed - No positive pregnancy test in past 12 months          metoprolol succinate ER (TOPROL-XL) 100 MG 24 hr tablet [Pharmacy Med Name: METOPROLOL SUCC ER 100MG TABLET] 90 tablet 3     Sig: TAKE 1 TABLET BY MOUTH  DAILY       Beta-Blockers Protocol Failed - 1/19/2021  9:10 PM        Failed - Blood pressure under 140/90 in past 12 months     BP Readings from Last 3 Encounters:   08/21/20 (!) 140/70   03/02/20 (!) 154/87 09/14/18 129/77                 Failed - Recent (12 mo) or future (30 days) visit within the authorizing provider's specialty     Patient has had an office visit with the authorizing provider or a provider within the authorizing providers department within the previous 12 mos or has a future within next 30 days. See \"Patient Info\" tab in inbasket, or \"Choose Columns\" " "in Meds & Orders section of the refill encounter.              Passed - Patient is age 6 or older        Passed - Medication is active on med list          lisinopril (ZESTRIL) 40 MG tablet [Pharmacy Med Name: LISINOPRIL  40MG  TAB] 90 tablet 3     Sig: TAKE 1 TABLET BY MOUTH  DAILY       ACE Inhibitors (Including Combos) Protocol Failed - 1/19/2021  9:10 PM        Failed - Blood pressure under 140/90 in past 12 months     BP Readings from Last 3 Encounters:   08/21/20 (!) 140/70   03/02/20 (!) 154/87   09/14/18 129/77                 Failed - Recent (12 mo) or future (30 days) visit within the authorizing provider's specialty     Patient has had an office visit with the authorizing provider or a provider within the authorizing providers department within the previous 12 mos or has a future within next 30 days. See \"Patient Info\" tab in inbasket, or \"Choose Columns\" in Meds & Orders section of the refill encounter.              Passed - Medication is active on med list        Passed - Patient is age 18 or older        Passed - No active pregnancy on record        Passed - Normal serum creatinine on file in past 12 months     Recent Labs   Lab Test 03/02/20  0747   CR 0.84       Ok to refill medication if creatinine is low          Passed - Normal serum potassium on file in past 12 months     Recent Labs   Lab Test 03/02/20  0747   POTASSIUM 4.0             Passed - No positive pregnancy test within past 12 months               "

## 2021-01-21 NOTE — TELEPHONE ENCOUNTER
90 days supply sent.  Is due for physical,fasting labs  mammogram in 3/2021- please help schedule for both.

## 2021-04-16 ENCOUNTER — ANCILLARY PROCEDURE (OUTPATIENT)
Dept: MAMMOGRAPHY | Facility: CLINIC | Age: 61
End: 2021-04-16
Attending: FAMILY MEDICINE
Payer: COMMERCIAL

## 2021-04-16 DIAGNOSIS — Z12.31 VISIT FOR SCREENING MAMMOGRAM: ICD-10-CM

## 2021-04-16 PROCEDURE — 77067 SCR MAMMO BI INCL CAD: CPT | Mod: GC | Performed by: RADIOLOGY

## 2021-05-12 ENCOUNTER — MYC MEDICAL ADVICE (OUTPATIENT)
Dept: FAMILY MEDICINE | Facility: CLINIC | Age: 61
End: 2021-05-12

## 2021-05-12 DIAGNOSIS — N18.31 STAGE 3A CHRONIC KIDNEY DISEASE (H): ICD-10-CM

## 2021-05-12 DIAGNOSIS — I10 ESSENTIAL HYPERTENSION WITH GOAL BLOOD PRESSURE LESS THAN 140/90: ICD-10-CM

## 2021-05-12 DIAGNOSIS — Z13.0 SCREENING FOR DEFICIENCY ANEMIA: Primary | ICD-10-CM

## 2021-05-12 DIAGNOSIS — Z13.1 SCREENING FOR DIABETES MELLITUS (DM): ICD-10-CM

## 2021-05-12 DIAGNOSIS — I12.9 BENIGN HYPERTENSION WITH CKD (CHRONIC KIDNEY DISEASE) STAGE III (H): ICD-10-CM

## 2021-05-12 DIAGNOSIS — N18.30 BENIGN HYPERTENSION WITH CKD (CHRONIC KIDNEY DISEASE) STAGE III (H): ICD-10-CM

## 2021-05-12 DIAGNOSIS — R73.01 ELEVATED FASTING GLUCOSE: ICD-10-CM

## 2021-05-12 DIAGNOSIS — E78.5 HYPERLIPIDEMIA LDL GOAL <100: ICD-10-CM

## 2021-05-13 NOTE — TELEPHONE ENCOUNTER
This writer attempted to contact pt on 05/13/21      Reason for call schedule pre visit lab and left message.  [facility] response also sent to pt    If patient calls back:   Schedule Lab appointment prior to provider visit with lab, document that pt called and close encounter         Theodora Fowler

## 2021-05-14 ENCOUNTER — ANCILLARY PROCEDURE (OUTPATIENT)
Dept: MAMMOGRAPHY | Facility: CLINIC | Age: 61
End: 2021-05-14
Attending: FAMILY MEDICINE
Payer: COMMERCIAL

## 2021-05-14 DIAGNOSIS — R92.8 ABNORMAL MAMMOGRAM: ICD-10-CM

## 2021-05-14 PROCEDURE — 77065 DX MAMMO INCL CAD UNI: CPT | Performed by: RADIOLOGY

## 2021-05-14 PROCEDURE — G0279 TOMOSYNTHESIS, MAMMO: HCPCS | Performed by: RADIOLOGY

## 2021-06-10 NOTE — PROGRESS NOTES
SUBJECTIVE:   CC: Jannet Love is an 60 year old woman who presents for preventive health visit.   Patient is here for annual physical and with other concerns as mentioned below.    Right elbow pain-complaining of pain on the inner aspect of right elbow for the past 2 months, intermittent, with no associated concerns for swelling, redness, warmth, fever, chills, history of injury or trauma to the affected area.    Patient is right-handed  She often lifts her grandkid who she is taking care of.  She is trying to avoid lifting weights during her workouts  Patient denies left-sided symptoms  Denies tingling, numbness of the right hand  Patient has been advised of split billing requirements and indicates understanding: Yes  Healthy Habits:     Getting at least 3 servings of Calcium per day:  NO    Bi-annual eye exam:  NO    Dental care twice a year:  NO    Sleep apnea or symptoms of sleep apnea:  None    Diet:  Low salt    Frequency of exercise:  6-7 days/week    Taking medications regularly:  Yes    Medication side effects:  None    PHQ-2 Total Score: 0    Additional concerns today:  Yes          Hyperlipidemia Follow-Up      Are you regularly taking any medication or supplement to lower your cholesterol?   Yes- Pravastatin 40 mg    Are you having muscle aches or other side effects that you think could be caused by your cholesterol lowering medication?  No      Today's PHQ-2 Score:   PHQ-2 ( 1999 [employer]) 6/11/2021   Q1: Little interest or pleasure in doing things 0   Q2: Feeling down, depressed or hopeless 0   PHQ-2 Score 0   Q1: Little interest or pleasure in doing things Not at all   Q2: Feeling down, depressed or hopeless Not at all   PHQ-2 Score 0       Abuse: Current or Past (Physical, Sexual or Emotional) - No  Do you feel safe in your environment? Yes        Social History     Tobacco Use     Smoking status: Never Smoker     Smokeless tobacco: Never Used   Substance Use Topics     Alcohol use: Yes      Comment: ocassionally         Alcohol Use 6/11/2021   Prescreen: >3 drinks/day or >7 drinks/week? No   Prescreen: >3 drinks/day or >7 drinks/week? -     Lab work is in process  Labs reviewed in EPIC  BP Readings from Last 3 Encounters:   06/11/21 131/78   08/21/20 (!) 140/70   03/02/20 (!) 154/87    Wt Readings from Last 3 Encounters:   06/11/21 77.9 kg (171 lb 11.2 oz)   08/21/20 83 kg (183 lb)   03/02/20 92.9 kg (204 lb 14.4 oz)                  Patient Active Problem List   Diagnosis     Excessive or frequent menstruation     Surveillance of previously prescribed intrauterine contraceptive device     Endometrial hyperplasia     Hyperlipidemia LDL goal <130     Benign hypertension with CKD (chronic kidney disease) stage III (H)     Dyslipidemia     Dermatofibroma of lower extremity     Prediabetes     Hypertriglyceridemia     CKD (chronic kidney disease) stage 3, GFR 30-59 ml/min     Essential hypertension with goal blood pressure less than 140/90     Plantar fasciitis     Adjustment insomnia     Past Surgical History:   Procedure Laterality Date     HEAD & NECK SURGERY       [facility] NONSPECIFIC PROCEDURE  2000    Hysteroscopy with deep endometrial biopsy. Endocervical curettage.     [facility] NONSPECIFIC PROCEDURE  1984    brachial cleft cyst repair       Social History     Tobacco Use     Smoking status: Never Smoker     Smokeless tobacco: Never Used   Substance Use Topics     Alcohol use: Yes     Comment: ocassionally     Family History   Problem Relation Age of Onset     Hypertension Mother      C.A.D. Mother         MI     C.A.D. Father         MI     Cardiovascular Father 49        MI, pacemaker in 2009     Heart Disease Father      Prostate Cancer Father      C.A.D. Brother         MI at age 41     Cardiovascular Brother      Heart Disease Brother      C.A.D. Maternal Grandfather      Diabetes Maternal Grandmother      Gastrointestinal Disease Daughter 11        Crohns Disease     Breast Cancer Paternal Grandmother       Alcohol/Drug Paternal Grandfather          Current Outpatient Medications   Medication Sig Dispense Refill     amLODIPine (NORVASC) 10 MG tablet Take 1 tablet (10 mg) by mouth daily 90 tablet 1     diclofenac (VOLTAREN) 1 % topical gel Apply 4 g topically 4 times daily as needed for moderate pain 100 g 1     lisinopril (ZESTRIL) 40 MG tablet Take 1 tablet (40 mg) by mouth daily 90 tablet 1     metoprolol succinate ER (TOPROL-XL) 100 MG 24 hr tablet Take 1 tablet (100 mg) by mouth daily 90 tablet 1     ASPIRIN 81 MG OR TABS ONE DAILY 100 3     Calcium-Vitamin D 600-200 MG-UNIT TABS Take 1 tablet by mouth daily        fish oil-omega-3 fatty acids (FISH OIL) 1000 MG capsule take 2 g by mouth daily.       pravastatin (PRAVACHOL) 40 MG tablet Take 1 tablet (40 mg) by mouth daily 90 tablet 3     traZODone (DESYREL) 50 MG tablet Take 0.5-1 tablets (25-50 mg) by mouth nightly as needed for sleep 90 tablet 3     Allergies   Allergen Reactions     No Known Drug Allergies      Recent Labs   Lab Test 08/21/20  0824 03/02/20  0747 09/14/18  0958 05/11/18  0925 12/07/16  0716 12/07/16  0716   A1C 5.5 6.0* 5.7*  --   --  5.6   LDL 89 112*  --  76   < > 133*   HDL 45* 43*  --  45*   < > 46*   TRIG 141 298*  --  97   < > 85   ALT  --  32 27  --   --  26   CR  --  0.84 1.06* 1.27*   < > 0.92   GFRESTIMATED  --  76 53* 43*   < > 63   GFRESTBLACK  --  88 64 52*   < > 76   POTASSIUM  --  4.0 3.9 4.2   < > 4.4   TSH  --  2.65 1.84  --   --   --     < > = values in this interval not displayed.      Reviewed orders with patient.  Reviewed health maintenance and updated orders accordingly - Yes      Breast Cancer Screening:    Breast CA Risk Assessment (FHS-7) 6/8/2021   Do you have a family history of breast, colon, or ovarian cancer? No / Unknown       click delete button to remove this line now  Mammogram Screening: Recommended mammography every 1-2 years with patient discussion and risk factor consideration  Pertinent mammograms are  reviewed under the imaging tab.    History of abnormal Pap smear: NO - age 30-65 PAP every 5 years with negative HPV co-testing recommended  PAP / HPV Latest Ref Rng & Units 3/2/2020 2017 3/22/2013   PAP - NIL NIL NIL   HPV 16 DNA NEG:Negative Negative Negative -   HPV 18 DNA NEG:Negative Negative Negative -   OTHER HR HPV NEG:Negative Negative Negative -     Reviewed and updated as needed this visit by clinical staff  Tobacco  Allergies    Med Hx  Surg Hx  Fam Hx  Soc Hx        Reviewed and updated as needed this visit by Provider                  Past Medical History:   Diagnosis Date     Bronchitis, not specified as acute or chronic      EXCESSIVE MENSTRUATION 2007    Mirena IUD placed for simple hyperplasia     INSERTION OF IUD 10/3/2007    Mirena IUD  For simple hyperplasia of the endometrium     Mixed hyperlipidemia      NONSPECIFIC MEDICAL HISTORY     cough     Unspecified essential hypertension       Past Surgical History:   Procedure Laterality Date     HEAD & NECK SURGERY       [facility] NONSPECIFIC PROCEDURE      Hysteroscopy with deep endometrial biopsy. Endocervical curettage.     [facility] NONSPECIFIC PROCEDURE      brachial cleft cyst repair     OB History    Para Term  AB Living   3 2 2 0 1 2   SAB TAB Ectopic Multiple Live Births   1 0 0 0 2      # Outcome Date GA Lbr Scar/2nd Weight Sex Delivery Anes PTL Lv   3 Term 86 40w0d   M    RALPH      Birth Comments: high bp      Name: Mars   2 SAB 85     SAB   DEC   1 Term 82 40w0d   F    RALPH      Birth Comments: High bp      Name: Tonya       CONSTITUTIONAL: NEGATIVE for fever, chills, change in weight  INTEGUMENTARY/SKIN: NEGATIVE for worrisome rashes, moles or lesions  EYES: NEGATIVE for vision changes or irritation  ENT: NEGATIVE for ear, mouth and throat problems  RESP: NEGATIVE for significant cough or SOB  BREAST: NEGATIVE for masses, tenderness or discharge  CV: NEGATIVE for chest pain, palpitations  "or peripheral edema  CV: Hx HTN  GI: NEGATIVE for nausea, abdominal pain, heartburn, or change in bowel habits  : NEGATIVE for unusual urinary or vaginal symptoms. No vaginal bleeding.  MUSCULOSKELETAL:as above  NEURO: NEGATIVE for weakness, dizziness or paresthesias  ENDOCRINE: NEGATIVE for temperature intolerance, skin/hair changes  HEME/ALLERGY/IMMUNE: NEGATIVE for bleeding problems  PSYCHIATRIC: NEGATIVE for changes in mood or affect      OBJECTIVE:   /78   Pulse 57   Temp 98.4  F (36.9  C) (Oral)   Resp 16   Ht 1.702 m (5' 7\")   Wt 77.9 kg (171 lb 11.2 oz)   LMP 08/26/2007   SpO2 100%   BMI 26.89 kg/m    Physical Exam  GENERAL APPEARANCE: healthy, alert and no distress  EYES: Eyes grossly normal to inspection, PERRL and conjunctivae and sclerae normal  HENT: ear canals and TM's normal, nose and mouth without ulcers or lesions, oropharynx clear and oral mucous membranes moist  NECK: no adenopathy, no asymmetry, masses, or scars and thyroid normal to palpation  RESP: lungs clear to auscultation - no rales, rhonchi or wheezes  BREAST: normal without masses, tenderness or nipple discharge and no palpable axillary masses or adenopathy  CV: regular rate and rhythm, normal S1 S2, no S3 or S4, no murmur, click or rub, no peripheral edema and peripheral pulses strong  ABDOMEN: soft, nontender, no hepatosplenomegaly, no masses and bowel sounds normal   (female): normal female external genitalia, normal urethral meatus, vaginal mucosal atrophy noted, normal cervix, adnexae, and uterus without masses or abnormal discharge  MS: Right elbow-not red or swollen on inspection  Minimal to moderate tenderness over the medial epicondyle, no elbow effusion, full range of motion  SKIN: no suspicious lesions or rashes  NEURO: Normal strength and tone, sensory exam grossly normal, mentation intact and speech normal  PSYCH: mentation appears normal and affect normal/bright    Diagnostic Test Results:  Labs reviewed in " Epic    ASSESSMENT/PLAN:   1. Routine general medical examination at a health care facility  Discussed on regular exercises, daily calcium intake, healthy eating, self breast exams monthly and routine dental checks      2. Medial epicondylitis of elbow, right  Reviewed etiology of medial epicondylitis, gave education handouts on the same.  Recommended to avoid triggering activities including lifting heavy weights,  Apply local ice and heat as needed, use topical diclofenac gel 4 times daily as needed for pain.  Recommended to use over-the-counter elbow strap  Follow-up if no better in 4 to 6 weeks or sooner if needed.  Consider imaging/physical therapy  for persistent or worsening concerns  Dosing and potential medication side effects discussed.  Patient verbalised understanding and is agreeable to the plan.        - diclofenac (VOLTAREN) 1 % topical gel; Apply 4 g topically 4 times daily as needed for moderate pain  Dispense: 100 g; Refill: 1    3. Benign hypertension with CKD (chronic kidney disease) stage III  BP Readings from Last 6 Encounters:   06/11/21 131/78   08/21/20 (!) 140/70   03/02/20 (!) 154/87   09/14/18 129/77   05/11/18 124/82   01/12/18 186/86     Initial blood pressure was 148/79, rechecked-131/78  Continue with current medications, follow for recheck in 6 months or sooner if needed  - lisinopril (ZESTRIL) 40 MG tablet; Take 1 tablet (40 mg) by mouth daily  Dispense: 90 tablet; Refill: 1  - metoprolol succinate ER (TOPROL-XL) 100 MG 24 hr tablet; Take 1 tablet (100 mg) by mouth daily  Dispense: 90 tablet; Refill: 1  - amLODIPine (NORVASC) 10 MG tablet; Take 1 tablet (10 mg) by mouth daily  Dispense: 90 tablet; Refill: 1  - **CBC with platelets FUTURE anytime  - Albumin Random Urine Quantitative with Creat Ratio  - **Comprehensive metabolic panel FUTURE anytime    4. Adjustment insomnia  Patient reported not taking the trazodone anymore, continue with sleep hygiene, follow-up as needed    5.  Encounter for screening mammogram for malignant neoplasm of breast  Reviewed mammogram from 2 months ago    6. Cervical cancer screening  Patient is not due for Pap until 2025    7. Colon cancer screening  Patient is due for colonoscopy this year, will call to schedule  - GASTROENTEROLOGY ADULT REF PROCEDURE ONLY; Future    8. Stage 3a chronic kidney disease  Last Comprehensive Metabolic Panel:  Sodium   Date Value Ref Range Status   06/11/2021 138 133 - 144 mmol/L Final     Potassium   Date Value Ref Range Status   06/11/2021 4.3 3.4 - 5.3 mmol/L Final     Chloride   Date Value Ref Range Status   06/11/2021 105 94 - 109 mmol/L Final     Carbon Dioxide   Date Value Ref Range Status   06/11/2021 28 20 - 32 mmol/L Final     Anion Gap   Date Value Ref Range Status   06/11/2021 5 3 - 14 mmol/L Final     Glucose   Date Value Ref Range Status   06/11/2021 103 (H) 70 - 99 mg/dL Final     Comment:     Fasting specimen     Urea Nitrogen   Date Value Ref Range Status   06/11/2021 17 7 - 30 mg/dL Final     Creatinine   Date Value Ref Range Status   06/11/2021 0.98 0.52 - 1.04 mg/dL Final     GFR Estimate   Date Value Ref Range Status   06/11/2021 62 >60 mL/min/[1.73_m2] Final     Comment:     Non  GFR Calc  Starting 12/18/2018, serum creatinine based estimated GFR (eGFR) will be   calculated using the Chronic Kidney Disease Epidemiology Collaboration   (CKD-EPI) equation.       Calcium   Date Value Ref Range Status   06/11/2021 9.2 8.5 - 10.1 mg/dL Final     Reviewed normal kidney functions, continue to monitor  - **CBC with platelets FUTURE anytime  - **Comprehensive metabolic panel FUTURE anytime    9. Prediabetes  Lab Results   Component Value Date    A1C 6.0 06/11/2021    A1C 5.5 08/21/2020    A1C 6.0 03/02/2020    A1C 5.7 09/14/2018    A1C 5.6 12/07/2016     Glucose   Date Value Ref Range Status   06/11/2021 103 (H) 70 - 99 mg/dL Final     Comment:     Fasting specimen   03/02/2020 115 (H) 70 - 99 mg/dL  Final     Comment:     Fasting specimen   09/14/2018 86 70 - 99 mg/dL Final     Comment:     Fasting specimen   05/11/2018 111 (H) 70 - 99 mg/dL Final     Comment:     Fasting specimen   12/27/2017 103 (H) 70 - 99 mg/dL Final     Comment:     Fasting specimen     Reviewed elevated fasting blood sugar and A1c consistent with prediabetes, continue with weight loss efforts, healthy eating, regular exercises, recheck in 6 months    - **A1C FUTURE anytime  - **Comprehensive metabolic panel FUTURE anytime  10. Elevated fasting glucose  as above    - **A1C FUTURE anytime  - **Comprehensive metabolic panel FUTURE anytime    11. Screening for diabetes mellitus (DM)    - **A1C FUTURE anytime  - **Comprehensive metabolic panel FUTURE anytime    12. Screening for deficiency anemia    - **CBC with platelets FUTURE anytime    13. Hyperlipidemia LDL goal <100  LDL Cholesterol Calculated   Date Value Ref Range Status   06/11/2021 87 <100 mg/dL Final     Comment:     Desirable:       <100 mg/dl     LDL is at goal, continue with current dose of pravastatin 40 mg daily  Healthy eating, regular exercises, recheck in 1 year or sooner if needed  - Lipid panel reflex to direct LDL Fasting  - **Comprehensive metabolic panel FUTURE anytime    Patient has been advised of split billing requirements and indicates understanding: Yes  COUNSELING:  Reviewed preventive health counseling, as reflected in patient instructions  Special attention given to:        Regular exercise       Healthy diet/nutrition       Vision screening       Osteoporosis prevention/bone health       Colon cancer screening       (Caroline)menopause management       The 10-year ASCVD risk score (Gloria ANGELES Jr., et al., 2013) is: 5%    Values used to calculate the score:      Age: 60 years      Sex: Female      Is Non- : No      Diabetic: No      Tobacco smoker: No      Systolic Blood Pressure: 131 mmHg      Is BP treated: Yes      HDL Cholesterol: 45 mg/dL    "   Total Cholesterol: 162 mg/dL    Estimated body mass index is 26.89 kg/m  as calculated from the following:    Height as of this encounter: 1.702 m (5' 7\").    Weight as of this encounter: 77.9 kg (171 lb 11.2 oz).    Weight management plan: Discussed healthy diet and exercise guidelines    She reports that she has never smoked. She has never used smokeless tobacco.      Counseling Resources:  ATP IV Guidelines  Pooled Cohorts Equation Calculator  Breast Cancer Risk Calculator  BRCA-Related Cancer Risk Assessment: FHS-7 Tool  FRAX Risk Assessment  ICSI Preventive Guidelines  Dietary Guidelines for Americans, 2010  USDA's MyPlate  ASA Prophylaxis  Lung CA Screening    Laverne Argueta MD  [facility]  "

## 2021-06-10 NOTE — PATIENT INSTRUCTIONS
Call  to schedule for colonoscopy    Preventive Health Recommendations  Female Ages 50 - 64    Yearly exam: See your health care provider every year in order to  o Review health changes.   o Discuss preventive care.    o Review your medicines if your doctor has prescribed any.      Get a Pap test every three years (unless you have an abnormal result and your provider advises testing more often).    If you get Pap tests with HPV test, you only need to test every 5 years, unless you have an abnormal result.     You do not need a Pap test if your uterus was removed (hysterectomy) and you have not had cancer.    You should be tested each year for STDs (sexually transmitted diseases) if you're at risk.     Have a mammogram every 1 to 2 years.    Have a colonoscopy at age 50, or have a yearly FIT test (stool test). These exams screen for colon cancer.      Have a cholesterol test every 5 years, or more often if advised.    Have a diabetes test (fasting glucose) every three years. If you are at risk for diabetes, you should have this test more often.     If you are at risk for osteoporosis (brittle bone disease), think about having a bone density scan (DEXA).    Shots: Get a flu shot each year. Get a tetanus shot every 10 years.    Nutrition:     Eat at least 5 servings of fruits and vegetables each day.    Eat whole-grain bread, whole-wheat pasta and brown rice instead of white grains and rice.    Get adequate Calcium and Vitamin D.     Lifestyle    Exercise at least 150 minutes a week (30 minutes a day, 5 days a week). This will help you control your weight and prevent disease.    Limit alcohol to one drink per day.    No smoking.     Wear sunscreen to prevent skin cancer.     See your dentist every six months for an exam and cleaning.    See your eye doctor every 1 to 2 years.    Patient Education     Understanding Medial Epicondylitis    Several muscles attach to the arm at the elbow joint. The tough bands  of tissue that attach muscle to bones are called tendons. The bone in the upper arm has knobs on the farthest end called epicondyles. Tendons attach some arm muscles to these knobs. The tissues in this area can become irritated.   Epicondylitis is the medical term for a painful elbow over the epicondyle. Medial refers to the inner side of the elbow. Medial epicondylitis is sometimes called  golfer s elbow.     How to say it  LEE-chidi-chelsie [id number]-[id number]   Causes of medial epicondylitis  A painful inner elbow may be caused by:    Using an elbow or hand the same way over and over    Using poor form or too much force in a sport such as golf, tennis, or baseball    Lifting too heavy a weight    Other injuries to the arm or elbow  Symptoms of medial epicondylitis    Pain or tenderness on the inside of the elbow that may travel down the forearm    Pain when moving the wrist    Pain or weakness when gripping something    A crackling sound or grating feeling when moving the elbow    Treatment for medial epicondylitis  Treatments may include:    Avoiding or changing the action that caused the problem. This helps prevent irritating the tissues more.    Prescription or over-the-counter medicines. These help reduce inflammation, swelling, and pain.    Braces. A counter-force brace can help reduce tendon strain. This allows the joint to heal.    Cold or heat packs. These help reduce pain and swelling.    Stretching and other exercises. These improve flexibility and strength.    Physical therapy. This may include exercises or other treatments.    Injections of medicine. This may relieve symptoms.  If other treatments don't relieve symptoms, you may need surgery.   Possible complications  If you don t give your elbow time to heal, symptoms may return or get worse. Follow your healthcare provider s instructions on resting and treating your elbow.   When to call your healthcare provider  Call your healthcare provider right  away if you have any of these:    Fever of 100.4 F (38 C) or higher, or as directed by your provider    Chills    Redness, swelling, or warmth that gets worse    Symptoms that don t get better with prescribed medicines, or get worse    New symptoms  [employer] last reviewed this educational content on 6/1/2019 2000-2021 [employer]. All rights reserved. This information is not intended as a substitute for professional medical care. Always follow your healthcare professional's instructions.           Patient Education     Radial Deviation (Strength)    This exercise is for your right wrist. Switch sides for your left wrist.   1. Stand up straight. Hold a hand weight in your right hand. Your healthcare provider will tell you what size of hand weight to use.  2. Keep your arm straight down at your side. Bend your wrist forward to lift the weight. Don t move your arm, only your wrist.  3. Hold for 5 seconds. Slowly lower your hand back down.  4. Repeat 5 to 10 times, or as instructed.  [employer] last reviewed this educational content on 3/1/2020    5249-4153 [employer]. All rights reserved. This information is not intended as a substitute for professional medical care. Always follow your healthcare professional's instructions.           Patient Education     Wrist Flexion (Strength)     This exercise is written for your right wrist. Switch sides for your left wrist .   5. Sit in a chair next to a table. Rest your right forearm on the table. Hang your right wrist off the edge of the table, palm up. Hold a hand weight in your right hand. Your healthcare provider will tell you what size of hand weight to use.  6. Keep your forearm in place and bend your wrist upward to lift the weight. Hold for 5 seconds.  7. Slowly lower the hand weight back down.  8. Repeat 5 times, or as instructed.  [employer] last reviewed this educational content on 2/1/2020 2000-2021 [employer]. All  rights reserved. This information is not intended as a substitute for professional medical care. Always follow your healthcare professional's instructions.           Patient Education     Wrist Flexion (Flexibility)    9. Stand or sit and hold your arms straight out in front of you.  10. Dangle your right hand at the wrist. Gently press down on your right hand with your left hand. Feel the stretch in your right wrist and the top of your hand.  11. Hold for 30 to 60 seconds, then relax.  12. Switch arms and repeat 2 times.   [employer] last reviewed this educational content on 6/1/2019 2000-2021 [employer]. All rights reserved. This information is not intended as a substitute for professional medical care. Always follow your healthcare professional's instructions.           Patient Education     Wrist Extension (Strength)     This exercise is written for your right forearm and wrist. Switch sides for your left forearm and wrist.  13. Sit in a chair. Hold a hand weight in your right hand. Your healthcare provider will tell you what size of hand weight to use.  14. Lean your forearm on your thigh or a table. Hang your wrist off the end of your knee or edge of the table, palm down.  15. Keep your forearm in place and bend your wrist upward. Lift the hand weight as high as you can.  16. Slowly lower the hand weight back down.  17. Repeat 5 times, or as instructed.  [employer] last reviewed this educational content on 6/1/2019 2000-2021 [employer]. All rights reserved. This information is not intended as a substitute for professional medical care. Always follow your healthcare professional's instructions.           Patient Education     Ulnar Deviation (Strength)     This exercise is written for your right wrist . Switch sides for your left wrist .  18. Stand up straight. Hold a hand weight in your right hand. Your healthcare provider will tell you what size hand weight to use.  19. Keep  your arm straight down at your side. Bend your wrist backward to lift the weight. Don t move your arm, only your wrist.  20. Hold for 5 seconds. Slowly lower your hand back down.  21. Repeat 5 to 10 times, or as instructed.  [employer] last reviewed this educational content on 6/1/2019 2000-2021 [employer]. All rights reserved. This information is not intended as a substitute for professional medical care. Always follow your healthcare professional's instructions.

## 2021-06-11 ENCOUNTER — OFFICE VISIT (OUTPATIENT)
Dept: FAMILY MEDICINE | Facility: CLINIC | Age: 61
End: 2021-06-11
Payer: COMMERCIAL

## 2021-06-11 ENCOUNTER — TELEPHONE (OUTPATIENT)
Dept: FAMILY MEDICINE | Facility: CLINIC | Age: 61
End: 2021-06-11

## 2021-06-11 VITALS
OXYGEN SATURATION: 100 % | DIASTOLIC BLOOD PRESSURE: 78 MMHG | BODY MASS INDEX: 26.95 KG/M2 | RESPIRATION RATE: 16 BRPM | WEIGHT: 171.7 LBS | HEIGHT: 67 IN | TEMPERATURE: 98.4 F | SYSTOLIC BLOOD PRESSURE: 131 MMHG | HEART RATE: 57 BPM

## 2021-06-11 DIAGNOSIS — N18.31 STAGE 3A CHRONIC KIDNEY DISEASE (H): ICD-10-CM

## 2021-06-11 DIAGNOSIS — N18.30 BENIGN HYPERTENSION WITH CKD (CHRONIC KIDNEY DISEASE) STAGE III (H): ICD-10-CM

## 2021-06-11 DIAGNOSIS — Z12.11 COLON CANCER SCREENING: ICD-10-CM

## 2021-06-11 DIAGNOSIS — Z13.1 SCREENING FOR DIABETES MELLITUS (DM): ICD-10-CM

## 2021-06-11 DIAGNOSIS — R73.03 PREDIABETES: ICD-10-CM

## 2021-06-11 DIAGNOSIS — E78.5 HYPERLIPIDEMIA LDL GOAL <130: ICD-10-CM

## 2021-06-11 DIAGNOSIS — Z00.00 ROUTINE GENERAL MEDICAL EXAMINATION AT A HEALTH CARE FACILITY: Primary | ICD-10-CM

## 2021-06-11 DIAGNOSIS — R73.01 ELEVATED FASTING GLUCOSE: ICD-10-CM

## 2021-06-11 DIAGNOSIS — M77.01 MEDIAL EPICONDYLITIS OF ELBOW, RIGHT: ICD-10-CM

## 2021-06-11 DIAGNOSIS — F51.02 ADJUSTMENT INSOMNIA: ICD-10-CM

## 2021-06-11 DIAGNOSIS — I12.9 BENIGN HYPERTENSION WITH CKD (CHRONIC KIDNEY DISEASE) STAGE III (H): ICD-10-CM

## 2021-06-11 DIAGNOSIS — Z12.31 ENCOUNTER FOR SCREENING MAMMOGRAM FOR MALIGNANT NEOPLASM OF BREAST: ICD-10-CM

## 2021-06-11 DIAGNOSIS — Z13.0 SCREENING FOR DEFICIENCY ANEMIA: ICD-10-CM

## 2021-06-11 DIAGNOSIS — Z12.4 CERVICAL CANCER SCREENING: ICD-10-CM

## 2021-06-11 PROBLEM — E66.9 OBESITY (BMI 30-39.9): Status: RESOLVED | Noted: 2018-01-12 | Resolved: 2021-06-11

## 2021-06-11 LAB
ALBUMIN SERPL-MCNC: 4.1 G/DL (ref 3.4–5)
ALP SERPL-CCNC: 70 U/L (ref 40–150)
ALT SERPL W P-5'-P-CCNC: 33 U/L (ref 0–50)
ANION GAP SERPL CALCULATED.3IONS-SCNC: 5 MMOL/L (ref 3–14)
AST SERPL W P-5'-P-CCNC: 27 U/L (ref 0–45)
BILIRUB SERPL-MCNC: 0.7 MG/DL (ref 0.2–1.3)
BUN SERPL-MCNC: 17 MG/DL (ref 7–30)
CALCIUM SERPL-MCNC: 9.2 MG/DL (ref 8.5–10.1)
CHLORIDE SERPL-SCNC: 105 MMOL/L (ref 94–109)
CHOLEST SERPL-MCNC: 147 MG/DL
CO2 SERPL-SCNC: 28 MMOL/L (ref 20–32)
CREAT SERPL-MCNC: 0.98 MG/DL (ref 0.52–1.04)
CREAT UR-MCNC: 176 MG/DL
ERYTHROCYTE [DISTWIDTH] IN BLOOD BY AUTOMATED COUNT: 12.2 % (ref 10–15)
GFR SERPL CREATININE-BSD FRML MDRD: 62 ML/MIN/{1.73_M2}
GLUCOSE SERPL-MCNC: 103 MG/DL (ref 70–99)
HBA1C MFR BLD: 6 % (ref 0–5.6)
HCT VFR BLD AUTO: 43.9 % (ref 35–47)
HDLC SERPL-MCNC: 48 MG/DL
HGB BLD-MCNC: 14.4 G/DL (ref 11.7–15.7)
LDLC SERPL CALC-MCNC: 87 MG/DL
MCH RBC QN AUTO: 30.1 PG (ref 26.5–33)
MCHC RBC AUTO-ENTMCNC: 32.8 G/DL (ref 31.5–36.5)
MCV RBC AUTO: 92 FL (ref 78–100)
MICROALBUMIN UR-MCNC: 8 MG/L
MICROALBUMIN/CREAT UR: 4.49 MG/G CR (ref 0–25)
NONHDLC SERPL-MCNC: 99 MG/DL
PLATELET # BLD AUTO: 268 10E9/L (ref 150–450)
POTASSIUM SERPL-SCNC: 4.3 MMOL/L (ref 3.4–5.3)
PROT SERPL-MCNC: 7.9 G/DL (ref 6.8–8.8)
RBC # BLD AUTO: 4.78 10E12/L (ref 3.8–5.2)
SODIUM SERPL-SCNC: 138 MMOL/L (ref 133–144)
TRIGL SERPL-MCNC: 62 MG/DL
WBC # BLD AUTO: 6.2 10E9/L (ref 4–11)

## 2021-06-11 PROCEDURE — 82043 UR ALBUMIN QUANTITATIVE: CPT | Performed by: FAMILY MEDICINE

## 2021-06-11 PROCEDURE — 80053 COMPREHEN METABOLIC PANEL: CPT | Performed by: FAMILY MEDICINE

## 2021-06-11 PROCEDURE — 99213 OFFICE O/P EST LOW 20 MIN: CPT | Mod: 25 | Performed by: FAMILY MEDICINE

## 2021-06-11 PROCEDURE — 85027 COMPLETE CBC AUTOMATED: CPT | Performed by: FAMILY MEDICINE

## 2021-06-11 PROCEDURE — 83036 HEMOGLOBIN GLYCOSYLATED A1C: CPT | Performed by: FAMILY MEDICINE

## 2021-06-11 PROCEDURE — 80061 LIPID PANEL: CPT | Performed by: FAMILY MEDICINE

## 2021-06-11 PROCEDURE — 99396 PREV VISIT EST AGE 40-64: CPT | Performed by: FAMILY MEDICINE

## 2021-06-11 PROCEDURE — 36415 COLL VENOUS BLD VENIPUNCTURE: CPT | Performed by: FAMILY MEDICINE

## 2021-06-11 RX ORDER — METOPROLOL SUCCINATE 100 MG/1
100 TABLET, EXTENDED RELEASE ORAL DAILY
Qty: 90 TABLET | Refills: 1 | Status: SHIPPED | OUTPATIENT
Start: 2021-06-11 | End: 2022-01-25

## 2021-06-11 RX ORDER — TRAZODONE HYDROCHLORIDE 50 MG/1
25-50 TABLET, FILM COATED ORAL
Qty: 90 TABLET | Refills: 3 | Status: CANCELLED | OUTPATIENT
Start: 2021-06-11

## 2021-06-11 RX ORDER — PRAVASTATIN SODIUM 40 MG
40 TABLET ORAL DAILY
Qty: 90 TABLET | Refills: 3 | Status: SHIPPED | OUTPATIENT
Start: 2021-06-11 | End: 2021-09-03

## 2021-06-11 RX ORDER — AMLODIPINE BESYLATE 10 MG/1
10 TABLET ORAL DAILY
Qty: 90 TABLET | Refills: 1 | Status: SHIPPED | OUTPATIENT
Start: 2021-06-11 | End: 2022-01-25

## 2021-06-11 RX ORDER — LISINOPRIL 40 MG/1
40 TABLET ORAL DAILY
Qty: 90 TABLET | Refills: 1 | Status: SHIPPED | OUTPATIENT
Start: 2021-06-11 | End: 2022-01-25

## 2021-06-11 ASSESSMENT — ENCOUNTER SYMPTOMS
HEMATURIA: 0
SHORTNESS OF BREATH: 0
FEVER: 0
FREQUENCY: 0
PALPITATIONS: 0
SORE THROAT: 0
DYSURIA: 0
ARTHRALGIAS: 1
NERVOUS/ANXIOUS: 1
DIZZINESS: 0
CONSTIPATION: 0
HEMATOCHEZIA: 0
PARESTHESIAS: 0
BREAST MASS: 0
HEADACHES: 0
NAUSEA: 0
WEAKNESS: 0
MYALGIAS: 0
CHILLS: 0
EYE PAIN: 0
DIARRHEA: 0
HEARTBURN: 0
COUGH: 0
ABDOMINAL PAIN: 0
JOINT SWELLING: 0

## 2021-06-11 ASSESSMENT — PAIN SCALES - GENERAL: PAINLEVEL: NO PAIN (0)

## 2021-06-11 ASSESSMENT — MIFFLIN-ST. JEOR: SCORE: 1376.46

## 2021-06-11 NOTE — TELEPHONE ENCOUNTER
Form faxed to [phone] and placed in scan bin to be scanned into chart.      Theodora NELSON (R))

## 2021-06-11 NOTE — RESULT ENCOUNTER NOTE
Dear Jannet,  Your labs showed normal urine with no protein leak, normal fasting cholesterol numbers, liver and kidney functions, hemoglobin and blood counts.  Your fasting blood sugar and A1c are elevated consistent with prediabetes as it was before.  Let us recheck these labs in 6 months  Continue with current medications with no change   Let me know if you have any questions. Take care.  Laverne Argueta MD

## 2021-06-11 NOTE — TELEPHONE ENCOUNTER
physician results form for [facility] received at todays visit. Form placed in MA bin,  Waiting lab results to be finalized prior to completing form.  Once complete, team to fax to [employer] and place in bin for scanning into pt's chart.

## 2021-06-11 NOTE — TELEPHONE ENCOUNTER
Form placed in Dr. Laverne Argueta for completion/signing.  Route back to  to fax once Signed.    /ELLIE Dunbar

## 2021-06-24 ENCOUNTER — TELEPHONE (OUTPATIENT)
Dept: FAMILY MEDICINE | Facility: CLINIC | Age: 61
End: 2021-06-24

## 2021-06-24 NOTE — TELEPHONE ENCOUNTER
Patient Quality Outreach      Summary:    Patient has the following on her problem list/HM:     Hypertension   Last three blood pressure readings:  BP Readings from Last 3 Encounters:   06/11/21 131/78   08/21/20 (!) 140/70   03/02/20 (!) 154/87     Blood pressure: Passed    HTN Guidelines:  ? 139/89     Patient is due/failing the following:   Colonoscopy    Type of outreach:    Sent [employer] message.    Questions for provider review:    None                                                                                                                                     Tonya Quispe

## 2022-10-02 DIAGNOSIS — E78.5 HYPERLIPIDEMIA LDL GOAL <130: ICD-10-CM

## 2022-10-03 RX ORDER — PRAVASTATIN SODIUM 40 MG
40 TABLET ORAL DAILY
Qty: 90 TABLET | Refills: 0 | Status: SHIPPED | OUTPATIENT
Start: 2022-10-03 | End: 2022-12-02 | Stop reason: ALTCHOICE

## 2022-10-31 NOTE — TELEPHONE ENCOUNTER
This writer attempted to contact pt on 10/31/22      Reason for call appt and left message.      If patient calls back:   Schedule physical and fasting lab appointment at her convenience with PCP and inform refill was sent for pracachol, document that pt called and close encounter         Kristin Romero CMA

## 2022-12-02 ENCOUNTER — APPOINTMENT (OUTPATIENT)
Dept: LAB | Facility: CLINIC | Age: 62
End: 2022-12-02
Payer: COMMERCIAL

## 2022-12-02 ENCOUNTER — OFFICE VISIT (OUTPATIENT)
Dept: FAMILY MEDICINE | Facility: CLINIC | Age: 62
End: 2022-12-02
Payer: COMMERCIAL

## 2022-12-02 VITALS
TEMPERATURE: 97.8 F | RESPIRATION RATE: 18 BRPM | SYSTOLIC BLOOD PRESSURE: 160 MMHG | HEIGHT: 67 IN | OXYGEN SATURATION: 98 % | HEART RATE: 70 BPM | DIASTOLIC BLOOD PRESSURE: 80 MMHG | BODY MASS INDEX: 26.89 KG/M2

## 2022-12-02 DIAGNOSIS — E78.1 HYPERTRIGLYCERIDEMIA: ICD-10-CM

## 2022-12-02 DIAGNOSIS — Z12.11 SCREEN FOR COLON CANCER: ICD-10-CM

## 2022-12-02 DIAGNOSIS — Z12.31 ENCOUNTER FOR SCREENING MAMMOGRAM FOR MALIGNANT NEOPLASM OF BREAST: ICD-10-CM

## 2022-12-02 DIAGNOSIS — N81.2 CYSTOCELE WITH SECOND DEGREE UTERINE PROLAPSE: ICD-10-CM

## 2022-12-02 DIAGNOSIS — Z00.00 ROUTINE GENERAL MEDICAL EXAMINATION AT A HEALTH CARE FACILITY: Primary | ICD-10-CM

## 2022-12-02 DIAGNOSIS — Z12.31 VISIT FOR SCREENING MAMMOGRAM: ICD-10-CM

## 2022-12-02 DIAGNOSIS — Z13.0 SCREENING FOR DEFICIENCY ANEMIA: ICD-10-CM

## 2022-12-02 DIAGNOSIS — E78.5 HYPERLIPIDEMIA LDL GOAL <100: ICD-10-CM

## 2022-12-02 DIAGNOSIS — J01.90 ACUTE SINUSITIS WITH SYMPTOMS > 10 DAYS: ICD-10-CM

## 2022-12-02 DIAGNOSIS — I10 BENIGN ESSENTIAL HYPERTENSION: ICD-10-CM

## 2022-12-02 DIAGNOSIS — Z13.1 SCREENING FOR DIABETES MELLITUS (DM): ICD-10-CM

## 2022-12-02 DIAGNOSIS — R73.01 ELEVATED FASTING GLUCOSE: ICD-10-CM

## 2022-12-02 LAB
ALBUMIN SERPL-MCNC: 4.1 G/DL (ref 3.4–5)
ALP SERPL-CCNC: 81 U/L (ref 40–150)
ALT SERPL W P-5'-P-CCNC: 34 U/L (ref 0–50)
ANION GAP SERPL CALCULATED.3IONS-SCNC: 5 MMOL/L (ref 3–14)
AST SERPL W P-5'-P-CCNC: 26 U/L (ref 0–45)
BILIRUB SERPL-MCNC: 0.7 MG/DL (ref 0.2–1.3)
BUN SERPL-MCNC: 19 MG/DL (ref 7–30)
CALCIUM SERPL-MCNC: 9.5 MG/DL (ref 8.5–10.1)
CHLORIDE BLD-SCNC: 109 MMOL/L (ref 94–109)
CHOLEST SERPL-MCNC: 183 MG/DL
CO2 SERPL-SCNC: 27 MMOL/L (ref 20–32)
CREAT SERPL-MCNC: 0.75 MG/DL (ref 0.52–1.04)
CREAT UR-MCNC: 268 MG/DL
ERYTHROCYTE [DISTWIDTH] IN BLOOD BY AUTOMATED COUNT: 12.6 % (ref 10–15)
FASTING STATUS PATIENT QL REPORTED: YES
GFR SERPL CREATININE-BSD FRML MDRD: 89 ML/MIN/1.73M2
GLUCOSE BLD-MCNC: 111 MG/DL (ref 70–99)
HBA1C MFR BLD: 6 % (ref 0–5.6)
HCT VFR BLD AUTO: 40.5 % (ref 35–47)
HDLC SERPL-MCNC: 44 MG/DL
HGB BLD-MCNC: 13.2 G/DL (ref 11.7–15.7)
LDLC SERPL CALC-MCNC: 106 MG/DL
MCH RBC QN AUTO: 30.3 PG (ref 26.5–33)
MCHC RBC AUTO-ENTMCNC: 32.6 G/DL (ref 31.5–36.5)
MCV RBC AUTO: 93 FL (ref 78–100)
MICROALBUMIN UR-MCNC: 30 MG/L
MICROALBUMIN/CREAT UR: 11.19 MG/G CR (ref 0–25)
NONHDLC SERPL-MCNC: 139 MG/DL
PLATELET # BLD AUTO: 236 10E3/UL (ref 150–450)
POTASSIUM BLD-SCNC: 4.2 MMOL/L (ref 3.4–5.3)
PROT SERPL-MCNC: 7.6 G/DL (ref 6.8–8.8)
RBC # BLD AUTO: 4.36 10E6/UL (ref 3.8–5.2)
SODIUM SERPL-SCNC: 141 MMOL/L (ref 133–144)
TRIGL SERPL-MCNC: 167 MG/DL
WBC # BLD AUTO: 7.6 10E3/UL (ref 4–11)

## 2022-12-02 PROCEDURE — 91313 COVID-19 VACCINE BIVALENT BOOSTER 18+ (MODERNA): CPT | Performed by: FAMILY MEDICINE

## 2022-12-02 PROCEDURE — 83036 HEMOGLOBIN GLYCOSYLATED A1C: CPT | Performed by: FAMILY MEDICINE

## 2022-12-02 PROCEDURE — 0134A COVID-19 VACCINE BIVALENT BOOSTER 18+ (MODERNA): CPT | Performed by: FAMILY MEDICINE

## 2022-12-02 PROCEDURE — 99396 PREV VISIT EST AGE 40-64: CPT | Mod: 25 | Performed by: FAMILY MEDICINE

## 2022-12-02 PROCEDURE — 85027 COMPLETE CBC AUTOMATED: CPT | Performed by: FAMILY MEDICINE

## 2022-12-02 PROCEDURE — 99214 OFFICE O/P EST MOD 30 MIN: CPT | Mod: 25 | Performed by: FAMILY MEDICINE

## 2022-12-02 PROCEDURE — 80061 LIPID PANEL: CPT | Performed by: FAMILY MEDICINE

## 2022-12-02 PROCEDURE — 36415 COLL VENOUS BLD VENIPUNCTURE: CPT | Performed by: FAMILY MEDICINE

## 2022-12-02 PROCEDURE — 82043 UR ALBUMIN QUANTITATIVE: CPT | Performed by: FAMILY MEDICINE

## 2022-12-02 PROCEDURE — 80053 COMPREHEN METABOLIC PANEL: CPT | Performed by: FAMILY MEDICINE

## 2022-12-02 RX ORDER — AMLODIPINE BESYLATE 10 MG/1
10 TABLET ORAL DAILY
Qty: 90 TABLET | Refills: 3 | Status: SHIPPED | OUTPATIENT
Start: 2022-12-02 | End: 2023-12-26

## 2022-12-02 RX ORDER — METOPROLOL SUCCINATE 100 MG/1
100 TABLET, EXTENDED RELEASE ORAL DAILY
Qty: 90 TABLET | Refills: 3 | Status: SHIPPED | OUTPATIENT
Start: 2022-12-02 | End: 2023-12-26

## 2022-12-02 RX ORDER — ATORVASTATIN CALCIUM 20 MG/1
20 TABLET, FILM COATED ORAL DAILY
Qty: 90 TABLET | Refills: 3 | Status: SHIPPED | OUTPATIENT
Start: 2022-12-02 | End: 2024-10-01

## 2022-12-02 RX ORDER — LISINOPRIL 40 MG/1
40 TABLET ORAL DAILY
Qty: 90 TABLET | Refills: 3 | Status: SHIPPED | OUTPATIENT
Start: 2022-12-02 | End: 2023-12-26

## 2022-12-02 NOTE — PROGRESS NOTES
SUBJECTIVE:   CC: Jannet is an 62 year old who presents for preventive health visit.     Patient has been advised of split billing requirements and indicates understanding: Yes  Healthy Habits:     Getting at least 3 servings of Calcium per day:  NO    Bi-annual eye exam:  Yes    Dental care twice a year:  NO    Sleep apnea or symptoms of sleep apnea:  Excessive snoring    Diet:  Regular (no restrictions)    Frequency of exercise:  None    Taking medications regularly:  No    Barriers to taking medications:  None    Medication side effects:  None    PHQ-2 Total Score: 0    Additional concerns today:  No  History of Present Illness       Hypertension: She presents for follow up of hypertension.  She does not check blood pressure  regularly outside of the clinic. Outside blood pressures have been over 140/90. She does not follow a low salt diet.    She is not taking prescribed medications regularly due to None.          Hyperlipidemia Follow-Up      Are you regularly taking any medication or supplement to lower your cholesterol?   Yes- Pravastatin 40 mg daily    Are you having muscle aches or other side effects that you think could be caused by your cholesterol lowering medication?  No    Hypertension Follow-up      Do you check your blood pressure regularly outside of the clinic? No     Are you following a low salt diet? No    Are your blood pressures ever more than 140 on the top number (systolic) OR more   than 90 on the bottom number (diastolic), for example 140/90?  N/A    Cough-complaining of a minimally productive cough associated with postnasal drip and nasal congestion for the past 6+ weeks  Was exposed to grandchildren having URI before the start of the symptoms  Denies shortness of breath, dyspnea on exertion, fever, chills, wheezing  Does not smoke. Has no h/o asthnma or allergies.      Today's PHQ-2 Score:   PHQ-2 ( 1999 [employer]) 12/1/2022   Q1: Little interest or pleasure in doing things 0   Q2:  Feeling down, depressed or hopeless 0   PHQ-2 Score 0   PHQ-2 Total Score (12-17 Years)- Positive if 3 or more points; Administer PHQ-A if positive -   Q1: Little interest or pleasure in doing things Not at all   Q2: Feeling down, depressed or hopeless Not at all   PHQ-2 Score 0           Social History     Tobacco Use     Smoking status: Never     Smokeless tobacco: Never   Substance Use Topics     Alcohol use: Yes     Comment: ocassionally     If you drink alcohol do you typically have >3 drinks per day or >7 drinks per week? No    No flowsheet data found.    Reviewed orders with patient.  Reviewed health maintenance and updated orders accordingly - Yes  Lab work is in process  Labs reviewed in EPIC  BP Readings from Last 3 Encounters:   12/02/22 (!) 160/80   06/11/21 131/78   08/21/20 (!) 140/70    Wt Readings from Last 3 Encounters:   06/11/21 77.9 kg (171 lb 11.2 oz)   08/21/20 83 kg (183 lb)   03/02/20 92.9 kg (204 lb 14.4 oz)                  Patient Active Problem List   Diagnosis     Excessive or frequent menstruation     Surveillance of previously prescribed intrauterine contraceptive device     Endometrial hyperplasia     Hyperlipidemia LDL goal <130     Elevated fasting glucose     Benign hypertension with CKD (chronic kidney disease) stage III (H)     Dyslipidemia     Dermatofibroma of lower extremity     Prediabetes     Hypertriglyceridemia     CKD (chronic kidney disease) stage 3, GFR 30-59 ml/min (H)     Essential hypertension with goal blood pressure less than 140/90     Plantar fasciitis     Adjustment insomnia     Past Surgical History:   Procedure Laterality Date     HEAD & NECK SURGERY       [facility] NONSPECIFIC PROCEDURE  2000    Hysteroscopy with deep endometrial biopsy. Endocervical curettage.     [facility] NONSPECIFIC PROCEDURE  1984    brachial cleft cyst repair       Social History     Tobacco Use     Smoking status: Never     Smokeless tobacco: Never   Substance Use Topics     Alcohol use: Yes      Comment: ocassionally     Family History   Problem Relation Age of Onset     Hypertension Mother      C.A.D. Mother         MI     C.A.D. Father         MI     Cardiovascular Father 49        MI, pacemaker in 2009     Heart Disease Father      Prostate Cancer Father      C.A.D. Brother         MI at age 41     Cardiovascular Brother      Heart Disease Brother      C.A.D. Maternal Grandfather      Diabetes Maternal Grandmother      Gastrointestinal Disease Daughter 11        Crohns Disease     Breast Cancer Paternal Grandmother      Alcohol/Drug Paternal Grandfather          Current Outpatient Medications   Medication Sig Dispense Refill     amLODIPine (NORVASC) 10 MG tablet Take 1 tablet (10 mg) by mouth daily 90 tablet 3     amoxicillin-clavulanate (AUGMENTIN) 875-125 MG tablet Take 1 tablet by mouth 2 times daily 14 tablet 0     ASPIRIN 81 MG OR TABS ONE DAILY 100 3     atorvastatin (LIPITOR) 20 MG tablet Take 1 tablet (20 mg) by mouth daily Replacing pravastatin 90 tablet 3     Calcium-Vitamin D 600-200 MG-UNIT TABS Take 1 tablet by mouth daily        fish oil-omega-3 fatty acids 1000 MG capsule take 2 g by mouth daily.       lisinopril (ZESTRIL) 40 MG tablet Take 1 tablet (40 mg) by mouth daily 90 tablet 3     metoprolol succinate ER (TOPROL XL) 100 MG 24 hr tablet Take 1 tablet (100 mg) by mouth daily 90 tablet 3     Allergies   Allergen Reactions     No Known Drug Allergies      Recent Labs   Lab Test 12/02/22  1023 06/11/21  0854 08/21/20  0824 03/02/20  0747 09/14/18  0958   A1C 6.0* 6.0* 5.5 6.0* 5.7*   * 87 89 112*  --    HDL 44* 48* 45* 43*  --    TRIG 167* 62 141 298*  --    ALT 34 33  --  32 27   CR 0.75 0.98  --  0.84 1.06*   GFRESTIMATED 89 62  --  76 53*   GFRESTBLACK  --  72  --  88 64   POTASSIUM 4.2 4.3  --  4.0 3.9   TSH  --   --   --  2.65 1.84        Breast Cancer Screening:    Breast CA Risk Assessment (FHS-7) 6/8/2021   Do you have a family history of breast, colon, or ovarian cancer?  No / Unknown       click delete button to remove this line now  Mammogram Screening: Recommended mammography every 1-2 years with patient discussion and risk factor consideration  Pertinent mammograms are reviewed under the imaging tab.    History of abnormal Pap smear: NO - age 30-65 PAP every 5 years with negative HPV co-testing recommended  PAP / HPV Latest Ref Rng & Units 3/2/2020 2017 3/22/2013   PAP (Historical) - NIL NIL NIL   HPV16 NEG:Negative Negative Negative -   HPV18 NEG:Negative Negative Negative -   HRHPV NEG:Negative Negative Negative -     Reviewed and updated as needed this visit by clinical staff   Tobacco  Allergies  Meds              Reviewed and updated as needed this visit by Provider     Meds               Past Medical History:   Diagnosis Date     Bronchitis, not specified as acute or chronic      EXCESSIVE MENSTRUATION 2007    Mirena IUD placed for simple hyperplasia     INSERTION OF IUD 10/3/2007    Mirena IUD  For simple hyperplasia of the endometrium     Mixed hyperlipidemia      NONSPECIFIC MEDICAL HISTORY     cough     Unspecified essential hypertension       Past Surgical History:   Procedure Laterality Date     HEAD & NECK SURGERY       [facility] NONSPECIFIC PROCEDURE      Hysteroscopy with deep endometrial biopsy. Endocervical curettage.     [facility] NONSPECIFIC PROCEDURE      brachial cleft cyst repair     OB History    Para Term  AB Living   3 2 2 0 1 2   SAB IAB Ectopic Multiple Live Births   1 0 0 0 2      # Outcome Date GA Lbr Scar/2nd Weight Sex Delivery Anes PTL Lv   3 Term 86 40w0d   M    RALPH      Birth Comments: high bp      Name: Mars   2 SAB 85     SAB   DEC   1 Term 82 40w0d   F    RALPH      Birth Comments: High bp      Name: Tonya       Review of Systems  CONSTITUTIONAL: NEGATIVE for fever, chills, change in weight  INTEGUMENTARY/SKIN: NEGATIVE for worrisome rashes, moles or lesions  EYES: NEGATIVE for vision changes or  "irritation  ENT: as above  RESP: Cough  BREAST: NEGATIVE for masses, tenderness or discharge  CV: NEGATIVE for chest pain, palpitations or peripheral edema  CV: History of hypertension  GI: NEGATIVE for nausea, abdominal pain, heartburn, or change in bowel habits  : NEGATIVE for unusual urinary or vaginal symptoms. No vaginal bleeding  Concerns for possible uterine prolapse.  MUSCULOSKELETAL: NEGATIVE for significant arthralgias or myalgia  NEURO: NEGATIVE for weakness, dizziness or paresthesias  ENDOCRINE: NEGATIVE for temperature intolerance, skin/hair changes  HEME/ALLERGY/IMMUNE: NEGATIVE for bleeding problems  PSYCHIATRIC: NEGATIVE for changes in mood or affect      OBJECTIVE:   BP (!) 160/80 (BP Location: Right arm, Patient Position: Sitting, Cuff Size: Adult Large)   Pulse 70   Temp 97.8  F (36.6  C) (Oral)   Resp 18   Ht 1.702 m (5' 7\")   LMP 08/26/2007   SpO2 98%   BMI 26.89 kg/m    Physical Exam  GENERAL APPEARANCE: healthy, alert and no distress  EYES: Eyes grossly normal to inspection, PERRL and conjunctivae and sclerae normal  HENT: nose and mouth without ulcers or lesions, oropharynx clear, oral mucous membranes moist and both ears: mucopurulent effusion  NECK: no adenopathy, no asymmetry, masses, or scars and thyroid normal to palpation  RESP: lungs clear to auscultation - no rales, rhonchi or wheezes  BREAST: normal without masses, tenderness or nipple discharge and no palpable axillary masses or adenopathy  CV: regular rate and rhythm, normal S1 S2, no S3 or S4, no murmur, click or rub, no peripheral edema and peripheral pulses strong  ABDOMEN: soft, nontender, no hepatosplenomegaly, no masses and bowel sounds normal   (female): normal female external genitalia, normal urethral meatus, vaginal mucosal atrophy noted, cystocele  and uterine prolapse   MS: no musculoskeletal defects are noted and gait is age appropriate without ataxia  SKIN: no suspicious lesions or rashes  NEURO: Normal " strength and tone, sensory exam grossly normal, mentation intact and speech normal  PSYCH: mentation appears normal and affect normal/bright    Diagnostic Test Results:  Labs reviewed in Epic    ASSESSMENT/PLAN:   (Z00.00) Routine general medical examination at a health care facility  (primary encounter diagnosis)  Comment:   Plan: Discussed on regular exercises, daily calcium intake, healthy eating, self breast exams monthly and routine dental checks    (J01.90) Acute sinusitis with symptoms > 10 days  Comment:   Plan: amoxicillin-clavulanate (AUGMENTIN) 875-125 MG         tablet        Likely causing her ongoing cough  Take Augmentin twice daily for the next 7 days, over-the-counter cough syrup as needed for cough, improvement noted in 2 weeks, patient understands to follow-up for further evaluation    (I10) Benign essential hypertension  Comment:   Plan: Albumin Random Urine Quantitative with Creat         Ratio, lisinopril (ZESTRIL) 40 MG tablet,         metoprolol succinate ER (TOPROL XL) 100 MG 24         hr tablet, amLODIPine (NORVASC) 10 MG tablet,         CBC with platelets, Comprehensive metabolic         panel (BMP + Alb, Alk Phos, ALT, AST, Total.         Bili, TP)          BP Readings from Last 6 Encounters:   12/02/22 (!) 160/80   06/11/21 131/78   08/21/20 (!) 140/70   03/02/20 (!) 154/87   09/14/18 129/77   05/11/18 124/82     Blood pressure is not at goal, patient has poor compliance in terms of medications and follow-up  Emphasized on taking medications as prescribed and follow-up with the staff for blood pressure recheck in 2 weeks  Will follow low salt diet, weight loss and regular exercises.      (E78.5) Hyperlipidemia LDL goal <100  Comment:   Plan: Lipid panel reflex to direct LDL Non-fasting,         atorvastatin (LIPITOR) 20 MG tablet          LDL Cholesterol Calculated   Date Value Ref Range Status   12/02/2022 106 (H) <=100 mg/dL Final   06/11/2021 87 <100 mg/dL Final     Comment:      Desirable:       <100 mg/dl     LDL is not at goal on current dose of pravastatin 40 mg daily  Will replace with Lipitor 20 mg daily, follow for recheck lipids in 3 months  Follow heart healthy diet, regular exercises     (E78.1) Hypertriglyceridemia  Comment:   Plan: Lipid panel reflex to direct LDL Non-fasting,         atorvastatin (LIPITOR) 20 MG tablet        as above      (N81.2) Cystocele with second degree uterine prolapse  Comment:   Plan: Ob/Gyn Referral        Recommended to consult gynecologist for further evaluation of her uterine prolapse    (Z12.11) Screen for colon cancer  Comment:   Plan: Colonoscopy Screening  Referral            (R73.01) Elevated fasting glucose  Comment:   Plan: HEMOGLOBIN A1C            (Z12.31) Visit for screening mammogram  Comment:   Plan: MA SCREENING DIGITAL BILAT - Future  (s+30)            (Z12.31) Encounter for screening mammogram for malignant neoplasm of breast  Comment:   Plan: MA SCREENING DIGITAL BILAT - Future  (s+30)            (Z13.0) Screening for deficiency anemia  Comment:   Plan: CBC with platelets            (Z13.1) Screening for diabetes mellitus (DM)  Comment:   Plan: Comprehensive metabolic panel (BMP + Alb, Alk         Phos, ALT, AST, Total. Bili, TP)                    COUNSELING:  Reviewed preventive health counseling, as reflected in patient instructions  Special attention given to:        Regular exercise       Healthy diet/nutrition       Vision screening       Immunizations    Vaccinated for: Covid-19 and Declined: Influenza due to Concerns about side effects/safety               Osteoporosis prevention/bone health       Colorectal Cancer Screening       (Caroline)menopause management       The 10-year ASCVD risk score (Debby BRITO, et al., 2019) is: 9.9%    Values used to calculate the score:      Age: 62 years      Sex: Female      Is Non- : No      Diabetic: No      Tobacco smoker: No      Systolic Blood Pressure: 160  mmHg      Is BP treated: Yes      HDL Cholesterol: 44 mg/dL      Total Cholesterol: 183 mg/dL        She reports that she has never smoked. She has never used smokeless tobacco.    Chart documentation done in part with Dragon Voice recognition Software. Although reviewed after completion, some word and grammatical error may remain.    Laverne Argueta MD  [facility]

## 2022-12-03 NOTE — RESULT ENCOUNTER NOTE
Tanmay Power,  Your labs showed normal hemoglobin with no concern for anemia, normal liver and kidney functions and urine exam with no protein leak.  This is good and reassuring  Your fasting blood sugar and A1c are slightly elevated consistent with prediabetes.  As we discussed, please start a regular exercises including brisk walking for at least 30 minutes daily along with watching her diet closely,Limiting or avoiding simple sugars, high starchy foods and high calorie foods.  Try to aim for a weight loss of 10 pounds in the next 1 year.  Your fasting cholesterol numbers are still elevated, I would recommend to switch the pravastatin to Lipitor 20 mg once daily .  Have a fasting lab recheck in 3 months to see the trend after switching medication.   Let me know if you have any questions. Take care.  Laverne Argueta MD

## 2023-01-10 ENCOUNTER — ANCILLARY PROCEDURE (OUTPATIENT)
Dept: MAMMOGRAPHY | Facility: CLINIC | Age: 63
End: 2023-01-10
Attending: FAMILY MEDICINE
Payer: COMMERCIAL

## 2023-01-10 ENCOUNTER — OFFICE VISIT (OUTPATIENT)
Dept: OBGYN | Facility: CLINIC | Age: 63
End: 2023-01-10
Attending: FAMILY MEDICINE
Payer: COMMERCIAL

## 2023-01-10 VITALS
HEART RATE: 59 BPM | WEIGHT: 195 LBS | DIASTOLIC BLOOD PRESSURE: 73 MMHG | OXYGEN SATURATION: 96 % | BODY MASS INDEX: 30.54 KG/M2 | SYSTOLIC BLOOD PRESSURE: 133 MMHG

## 2023-01-10 DIAGNOSIS — N81.2 CYSTOCELE WITH SECOND DEGREE UTERINE PROLAPSE: ICD-10-CM

## 2023-01-10 DIAGNOSIS — Z12.31 ENCOUNTER FOR SCREENING MAMMOGRAM FOR MALIGNANT NEOPLASM OF BREAST: ICD-10-CM

## 2023-01-10 DIAGNOSIS — Z12.31 VISIT FOR SCREENING MAMMOGRAM: ICD-10-CM

## 2023-01-10 PROCEDURE — 99204 OFFICE O/P NEW MOD 45 MIN: CPT | Performed by: OBSTETRICS & GYNECOLOGY

## 2023-01-10 PROCEDURE — 77067 SCR MAMMO BI INCL CAD: CPT | Mod: GC | Performed by: RADIOLOGY

## 2023-01-10 NOTE — PATIENT INSTRUCTIONS
To Schedule an Appointment 24/7  Call: [phone]    If you have any questions regarding your visit, Please contact your care team.  [facility]: [phone]  [facility] HOURS TELEPHONE NUMBER   Cephas Agbeh, M.D.      Michell Philip-Surgery Scheduler  Beti-Surgery Scheduler         Monday - [place]:    8:00 am-4:45 pm  Tuesday - [place]:   8:00 am-4:45 pm  Friday-[place]:       8:00 am-4:45 pm  Typical Surgery Day:  Wednesday [facility]   [address]   [place] [id number] [phone]   Fax [phone]    Imaging Scheduling all locations  [phone]      [facility] Delivery   [address] [place] [address] [address]   [place], [place] [address] [phone]    [facility]  [address] [address] [address]  [phone]  Fax [phone]     Urgent Care locations:  [facility] Monday-Friday                               10 am - 8 pm  Saturday and Sunday                      9 am - 5 pm  Monday-Friday                              10 am- 8 pm  Saturday and Sunday                      9 am - 5 pm    [phone]   **Surgeries** Our Surgery Schedulers will contact you to schedule. If you do not receive a call within 3 business days, please call [phone].  If you need a medication refill, please contact your pharmacy. Please allow 3 business days for your refill to be completed.  As always, Thank you for trusting us with your healthcare needs!  see additional instructions from your care team below

## 2023-01-10 NOTE — PROGRESS NOTES
Jannet is a 62 year old  referred here by Dr. WOODS for consultation regarding cystocele.  She reports that she felt a bulge in her vagina in 2022.  This came along with some pelvic pressure.  She has always had some urinary symptoms with some leakage with activity and coughing.  The symptoms have worsened slightly.  She denies any bowel movement problems with constipation.  She actually has some explosive bowel movement problems..    She went through menopause over 10 years ago and has not used any hormonal replacement therapy..      ROS: Ten point review of systems was reviewed and negative except the above.    Gyne: - abn pap (last pap ), - STD's    Past Medical History:   Diagnosis Date     Bronchitis, not specified as acute or chronic      EXCESSIVE MENSTRUATION 2007    Mirena IUD placed for simple hyperplasia     INSERTION OF IUD 10/3/2007    Mirena IUD  For simple hyperplasia of the endometrium     Mixed hyperlipidemia      NONSPECIFIC MEDICAL HISTORY     cough     Unspecified essential hypertension      Past Surgical History:   Procedure Laterality Date     HEAD & NECK SURGERY       [facility] NONSPECIFIC PROCEDURE      Hysteroscopy with deep endometrial biopsy. Endocervical curettage.     [facility] NONSPECIFIC PROCEDURE      brachial cleft cyst repair     Patient Active Problem List   Diagnosis     Excessive or frequent menstruation     Surveillance of previously prescribed intrauterine contraceptive device     Endometrial hyperplasia     Hyperlipidemia LDL goal <130     Elevated fasting glucose     Benign hypertension with CKD (chronic kidney disease) stage III (H)     Dyslipidemia     Dermatofibroma of lower extremity     Prediabetes     Hypertriglyceridemia     CKD (chronic kidney disease) stage 3, GFR 30-59 ml/min (H)     Essential hypertension with goal blood pressure less than 140/90     Plantar fasciitis     Adjustment insomnia       ALL/Meds: Her medication and allergy histories were  reviewed and are documented in their appropriate chart areas.    SH: - tob, - EtOH,     FH: Her family history was reviewed and documented in its appropriate chart area.    PE: /73 (BP Location: Right arm, Patient Position: Sitting, Cuff Size: Adult Large)   Pulse 59   Wt 88.5 kg (195 lb)   LMP 08/26/2007   SpO2 96%   BMI 30.54 kg/m    Body mass index is 30.54 kg/m .    General Appearance:  healthy, alert, active, no distress  HEENT: NCAT  Abdomen: Soft, nontender.  Normal bowel sounds.  No masses  Pelvic:       - Ext: NEFG,        - Urethra: no lesions, no masses, no hypermobility       - Urethral Meatus: normal appearance,        - Bladder: no tenderness, no masses       - Vagina: pink, moist, normal rugae, no lesions, no discharge       - Cervix: no lesions, parous       - Uterus: normal sized, AV/RV/Midplane, mobile, normal contour       - Adnexa: no masses, no tenderness       - Rectal: deferred,        - Pelvic support: moderate cystocele, no rectocele, mild uterine prolapse    A/P    ICD-10-CM    1. Cystocele with second degree uterine prolapse  N81.2 Ob/Gyn Referral      I discussed pelvic support and how it can be damaged.  We discussed the association of the pelvic organs and how they are attached.  We discussed treatment options including kegel exercises, physical therapy and surgical repair of her particular issues.  I discussed Kegel exercises as a way to increase the strength of the pelvic floor muscles.  I explained how this can affect bladder and urethral support and the way this is linked to bladder control.  She expresses understanding.    Written information was provided to her about the above topics.  We also discussed referral to urologist to discuss mesh for surgery if she is interested..    She will return for further discussion when she makes up her mind for treatment that she decides upon..    Total time preparing to see patient with reviewing prior encounter and labs, face to  face time,  and coordinating care on the same calendar date: 45 mins      CEPHAS AGBEH, MD.

## 2023-12-22 DIAGNOSIS — I10 BENIGN ESSENTIAL HYPERTENSION: ICD-10-CM

## 2023-12-26 RX ORDER — AMLODIPINE BESYLATE 10 MG/1
10 TABLET ORAL DAILY
Qty: 30 TABLET | Refills: 1 | Status: SHIPPED | OUTPATIENT
Start: 2023-12-26 | End: 2024-03-13

## 2023-12-26 RX ORDER — LISINOPRIL 40 MG/1
40 TABLET ORAL DAILY
Qty: 30 TABLET | Refills: 1 | Status: SHIPPED | OUTPATIENT
Start: 2023-12-26 | End: 2024-03-13

## 2023-12-26 RX ORDER — METOPROLOL SUCCINATE 100 MG/1
100 TABLET, EXTENDED RELEASE ORAL DAILY
Qty: 30 TABLET | Refills: 1 | Status: SHIPPED | OUTPATIENT
Start: 2023-12-26 | End: 2024-03-13

## 2024-02-14 ENCOUNTER — NURSE TRIAGE (OUTPATIENT)
Dept: FAMILY MEDICINE | Facility: CLINIC | Age: 64
End: 2024-02-14

## 2024-02-14 ENCOUNTER — OFFICE VISIT (OUTPATIENT)
Dept: URGENT CARE | Facility: URGENT CARE | Age: 64
End: 2024-02-14
Payer: COMMERCIAL

## 2024-02-14 VITALS
DIASTOLIC BLOOD PRESSURE: 83 MMHG | TEMPERATURE: 97.6 F | OXYGEN SATURATION: 97 % | HEART RATE: 63 BPM | SYSTOLIC BLOOD PRESSURE: 164 MMHG

## 2024-02-14 DIAGNOSIS — H10.33 ACUTE BACTERIAL CONJUNCTIVITIS OF BOTH EYES: Primary | ICD-10-CM

## 2024-02-14 DIAGNOSIS — H11.421 CHEMOSIS OF RIGHT CONJUNCTIVA: ICD-10-CM

## 2024-02-14 PROCEDURE — 99213 OFFICE O/P EST LOW 20 MIN: CPT | Performed by: PHYSICIAN ASSISTANT

## 2024-02-14 RX ORDER — OFLOXACIN 3 MG/ML
1-2 SOLUTION/ DROPS OPHTHALMIC
Qty: 10 ML | Refills: 0 | Status: SHIPPED | OUTPATIENT
Start: 2024-02-14 | End: 2024-02-21

## 2024-02-14 RX ORDER — CETIRIZINE HYDROCHLORIDE 10 MG/1
10 TABLET ORAL DAILY
Qty: 30 TABLET | Refills: 0 | Status: SHIPPED | OUTPATIENT
Start: 2024-02-14

## 2024-02-14 NOTE — TELEPHONE ENCOUNTER
Patient called regarding concerns of potential pink eye. Reports that her granddaughter and  recently just got over this. Symptoms started about yesterday midday. Reports pain is constant and rates it about a 4/10. Reports she has moderate swelling on right eyelid, about the size of half a pea. Reports having blurred vision in right eye. Reports having yellow drainage coming out of both eyes. Reports also having a headache, nasal drainage, redness around the eye, ongoing cough and sore throat. Reports she will wake up and both eyes be will crusted over.     Denies any fever/chills, difficulty breathing, chest pain, wheezing, rash/redness, nausea/vomiting, dizziness or any other symptoms.     Per protocol, advised patient to be seen in urgent care today. Patient agreed with recommendation and is planning on being seen at the [facility] urgent care today. Patient had no further questions/concerns at this time.     DINORA Waller, RN   [facility]    Reason for Disposition   MODERATE eye pain or discomfort (e.g., interferes with normal activities or awakens from sleep; more than mild)   Looking at light causes MODERATE to SEVERE eye pain (i.e., photophobia)   Blurred vision and new or worsening    Additional Information   Negative: Eyelid (outer) is very red and fever   Negative: Complete loss of vision in one or both eyes   Negative: SEVERE eye pain   Negative: Eyelids are very swollen (shut or almost) and fever   Negative: Foreign body sensation ('feels like something is in there') and irrigation didn't help   Negative: Vomiting   Negative: Recent eye surgery and increasing eye pain   Negative: Patient sounds very sick or weak to the triager   Negative: Followed an eye injury   Negative: Eye pain from chemical in the eye   Negative: Eye pain from foreign body in eye   Negative: Has sinus pain or pressure    Answer Assessment - Initial Assessment  "Questions  1. ONSET: \"When did the pain start?\" (e.g., minutes, hours, days)      Yesterday midday  2. TIMING: \"Does the pain come and go, or has it been constant since it started?\" (e.g., constant, intermittent, fleeting)      Constant   3. SEVERITY: \"How bad is the pain?\"   (Scale 1-10; mild, moderate or severe)    - MILD (1-3): doesn't interfere with normal activities     - MODERATE (4-7): interferes with normal activities or awakens from sleep     - SEVERE (8-10): excruciating pain and patient unable to do normal activities      4/10  4. LOCATION: \"Where does it hurt?\"  (e.g., eyelid, eye, cheekbone)      Right eye on eyelid where swelling/ bump is  5. CAUSE: \"What do you think is causing the pain?\"      Pink eye?  6. VISION: \"Do you have blurred vision or changes in your vision?\"       Vision is blurry in right eye   7. EYE DISCHARGE: \"Is there any discharge (pus) from the eye(s)?\"  If Yes, ask: \"What color is it?\"       Yes, yellow.   8. FEVER: \"Do you have a fever?\" If Yes, ask: \"What is it, how was it measured, and when did it start?\"       No fever. No chills.   9. OTHER SYMPTOMS: \"Do you have any other symptoms?\" (e.g., headache, nasal discharge, facial rash)      Reports headache, nasal discharge, redness around eye, cough for a couple weeks, sore throat,   No rash/redness, shortness of breath, wheezing, chest pain, n/v, dizziness  10. PREGNANCY: \"Is there any chance you are pregnant?\" \"When was your last menstrual period?\"        No    Protocols used: Eye Pain and Other Symptoms-A-[place]    "

## 2024-02-14 NOTE — PATIENT INSTRUCTIONS
Start the drops and Zyrtec.  Cold compresses several times a day.  If you are not improving over the next 48 hours or if any worsening symptoms including worsening vision, headaches, fevers or chills please go to the emergency room or be seen by an ophthalmologist

## 2024-02-14 NOTE — PROGRESS NOTES
Chief Complaint   Patient presents with    Urgent Care     Pt reports bilateral pink eye. Right eye has 'buldge' in it.       ASSESSMENT/PLAN:  Jannet was seen today for urgent care.    Diagnoses and all orders for this visit:    Acute bacterial conjunctivitis of both eyes  -     ofloxacin (OCUFLOX) 0.3 % ophthalmic solution; Place 1-2 drops into both eyes every 2 hours (while awake) for 7 days    Chemosis of right conjunctiva  -     cetirizine (ZYRTEC) 10 MG tablet; Take 1 tablet (10 mg) by mouth daily    Bilateral conjunctivitis with secondary chemosis on the right side.  No evidence of foreign body, penetrating injury and visual acuity is unchanged.  Cool compresses, Zyrtec and Ocuflox.    Daniel Gunn PA-C      SUBJECTIVE:  Jannet is a 63 year old female who presents to urgent care with concerns for pinkeye in the right eye has a bulge in the eye.  Started last night and had goopy discharge since then.  No vision changes unless there is some discharge.  Wears glasses, no contacts.  Her  had pinkeye last week after her granddaughter had it the week before.  No acute injuries or trauma    ROS: Pertinent ROS neg other than the symptoms noted above in the HPI.     OBJECTIVE:  BP (!) 164/83   Pulse 63   Temp 97.6  F (36.4  C) (Tympanic)   LMP 08/26/2007   SpO2 97%    GENERAL: alert and no distress  EYES: Bilateral conjunctival erythema, chemosis of the right eye with serous fluid and some blood noted, mild subconjunctival hemorrhage, dried discharge at the corner of the eyes bilaterally.  PERRLA, no photophobia  HENT: ear canals and TM's normal, nose and mouth without ulcers or lesions  Vision Acuity Screen     Vision Acuity Tool HOTV   RIGHT EYE 10/12.5 (20/25)   LEFT EYE 10/12.5 (20/25)   Is there a two line difference? No   Vision Screen Results Pass         DIAGNOSTICS    No results found for any visits on 02/14/24.     Current Outpatient Medications   Medication    amLODIPine (NORVASC) 10 MG  tablet    ASPIRIN 81 MG OR TABS    atorvastatin (LIPITOR) 20 MG tablet    Calcium-Vitamin D 600-200 MG-UNIT TABS    fish oil-omega-3 fatty acids 1000 MG capsule    lisinopril (ZESTRIL) 40 MG tablet    metoprolol succinate ER (TOPROL XL) 100 MG 24 hr tablet    amoxicillin-clavulanate (AUGMENTIN) 875-125 MG tablet     No current facility-administered medications for this visit.      Patient Active Problem List   Diagnosis    Excessive or frequent menstruation    Surveillance of previously prescribed intrauterine contraceptive device    Endometrial hyperplasia    Hyperlipidemia LDL goal <130    Elevated fasting glucose    Benign hypertension with CKD (chronic kidney disease) stage III (H)    Dyslipidemia    Dermatofibroma of lower extremity    Prediabetes    Hypertriglyceridemia    CKD (chronic kidney disease) stage 3, GFR 30-59 ml/min (H)    Essential hypertension with goal blood pressure less than 140/90    Plantar fasciitis    Adjustment insomnia      Past Medical History:   Diagnosis Date    Bronchitis, not specified as acute or chronic     EXCESSIVE MENSTRUATION 8/20/2007    Mirena IUD placed for simple hyperplasia    INSERTION OF IUD 10/3/2007    Mirena IUD  For simple hyperplasia of the endometrium    Mixed hyperlipidemia     NONSPECIFIC MEDICAL HISTORY     cough    Unspecified essential hypertension      Past Surgical History:   Procedure Laterality Date    HEAD & NECK SURGERY      [facility] NONSPECIFIC PROCEDURE  2000    Hysteroscopy with deep endometrial biopsy. Endocervical curettage.    [facility] NONSPECIFIC PROCEDURE  1984    brachial cleft cyst repair     Family History   Problem Relation Age of Onset    Hypertension Mother     C.A.D. Mother         MI    C.A.D. Father         MI    Cardiovascular Father 49        MI, pacemaker in 2009    Heart Disease Father     Prostate Cancer Father     C.A.D. Brother         MI at age 41    Cardiovascular Brother     Heart Disease Brother     C.A.D. Maternal Grandfather      Diabetes Maternal Grandmother     Gastrointestinal Disease Daughter 11        Crohns Disease    Breast Cancer Paternal Grandmother     Alcohol/Drug Paternal Grandfather      Social History     Tobacco Use    Smoking status: Never    Smokeless tobacco: Never   Substance Use Topics    Alcohol use: Yes     Comment: ocassionally              The plan of care was discussed with the patient. They understand and agree with the course of treatment prescribed. A printed summary was given including instructions and medications.  The use of Dragon/[employer] dictation services may have been used to construct the content in this note; any grammatical or spelling errors are non-intentional. Please contact the author of this note directly if you are in need of any clarification.

## 2024-03-12 DIAGNOSIS — H11.421 CHEMOSIS OF RIGHT CONJUNCTIVA: ICD-10-CM

## 2024-03-13 RX ORDER — CETIRIZINE HYDROCHLORIDE 10 MG/1
10 TABLET ORAL DAILY
Qty: 30 TABLET | Refills: 0 | OUTPATIENT
Start: 2024-03-13

## 2024-03-13 NOTE — TELEPHONE ENCOUNTER
This writer attempted to contact patient on 03/13/24      Reason for call message and left detailed message.      If patient calls back:   Registered Nurse called.       Celine Renee RN  [facility]

## 2024-03-16 ENCOUNTER — HEALTH MAINTENANCE LETTER (OUTPATIENT)
Age: 64
End: 2024-03-16

## 2024-04-26 DIAGNOSIS — I10 BENIGN ESSENTIAL HYPERTENSION: ICD-10-CM

## 2024-04-30 RX ORDER — METOPROLOL SUCCINATE 100 MG/1
100 TABLET, EXTENDED RELEASE ORAL DAILY
Qty: 60 TABLET | Refills: 5 | OUTPATIENT
Start: 2024-04-30

## 2024-04-30 RX ORDER — AMLODIPINE BESYLATE 10 MG/1
10 TABLET ORAL DAILY
Qty: 60 TABLET | Refills: 5 | OUTPATIENT
Start: 2024-04-30

## 2024-04-30 RX ORDER — LISINOPRIL 40 MG/1
40 TABLET ORAL DAILY
Qty: 60 TABLET | Refills: 5 | OUTPATIENT
Start: 2024-04-30

## 2024-05-09 ENCOUNTER — ANCILLARY PROCEDURE (OUTPATIENT)
Dept: MAMMOGRAPHY | Facility: CLINIC | Age: 64
End: 2024-05-09
Attending: FAMILY MEDICINE
Payer: COMMERCIAL

## 2024-05-09 DIAGNOSIS — Z12.31 BREAST CANCER SCREENING BY MAMMOGRAM: ICD-10-CM

## 2024-05-09 PROCEDURE — 77067 SCR MAMMO BI INCL CAD: CPT | Mod: GC | Performed by: RADIOLOGY

## 2024-10-01 ENCOUNTER — ORDERS ONLY (AUTO-RELEASED) (OUTPATIENT)
Dept: FAMILY MEDICINE | Facility: CLINIC | Age: 64
End: 2024-10-01

## 2024-10-01 ENCOUNTER — OFFICE VISIT (OUTPATIENT)
Dept: FAMILY MEDICINE | Facility: CLINIC | Age: 64
End: 2024-10-01
Payer: COMMERCIAL

## 2024-10-01 VITALS
SYSTOLIC BLOOD PRESSURE: 155 MMHG | HEIGHT: 67 IN | OXYGEN SATURATION: 99 % | HEART RATE: 57 BPM | TEMPERATURE: 97.6 F | DIASTOLIC BLOOD PRESSURE: 88 MMHG | RESPIRATION RATE: 16 BRPM | WEIGHT: 202.3 LBS | BODY MASS INDEX: 31.75 KG/M2

## 2024-10-01 DIAGNOSIS — Z13.0 SCREENING FOR DEFICIENCY ANEMIA: ICD-10-CM

## 2024-10-01 DIAGNOSIS — Z12.11 SCREEN FOR COLON CANCER: ICD-10-CM

## 2024-10-01 DIAGNOSIS — I10 BENIGN ESSENTIAL HYPERTENSION: Primary | ICD-10-CM

## 2024-10-01 DIAGNOSIS — E78.1 HYPERTRIGLYCERIDEMIA: ICD-10-CM

## 2024-10-01 DIAGNOSIS — Z13.1 SCREENING FOR DIABETES MELLITUS (DM): ICD-10-CM

## 2024-10-01 DIAGNOSIS — R73.03 PREDIABETES: ICD-10-CM

## 2024-10-01 DIAGNOSIS — E78.5 HYPERLIPIDEMIA LDL GOAL <100: ICD-10-CM

## 2024-10-01 DIAGNOSIS — R73.01 ELEVATED FASTING GLUCOSE: ICD-10-CM

## 2024-10-01 PROCEDURE — 90471 IMMUNIZATION ADMIN: CPT | Performed by: FAMILY MEDICINE

## 2024-10-01 PROCEDURE — 90750 HZV VACC RECOMBINANT IM: CPT | Performed by: FAMILY MEDICINE

## 2024-10-01 PROCEDURE — 91320 SARSCV2 VAC 30MCG TRS-SUC IM: CPT | Performed by: FAMILY MEDICINE

## 2024-10-01 PROCEDURE — 99214 OFFICE O/P EST MOD 30 MIN: CPT | Mod: 25 | Performed by: FAMILY MEDICINE

## 2024-10-01 PROCEDURE — 90480 ADMN SARSCOV2 VAC 1/ONLY CMP: CPT | Performed by: FAMILY MEDICINE

## 2024-10-01 RX ORDER — AMLODIPINE BESYLATE 10 MG/1
10 TABLET ORAL DAILY
Qty: 90 TABLET | Refills: 0 | Status: SHIPPED | OUTPATIENT
Start: 2024-10-01

## 2024-10-01 RX ORDER — ATORVASTATIN CALCIUM 20 MG/1
20 TABLET, FILM COATED ORAL DAILY
Qty: 90 TABLET | Refills: 0 | Status: SHIPPED | OUTPATIENT
Start: 2024-10-01

## 2024-10-01 RX ORDER — LISINOPRIL AND HYDROCHLOROTHIAZIDE 12.5; 2 MG/1; MG/1
2 TABLET ORAL EVERY MORNING
Qty: 180 TABLET | Refills: 0 | Status: SHIPPED | OUTPATIENT
Start: 2024-10-01

## 2024-10-01 RX ORDER — METOPROLOL SUCCINATE 100 MG/1
100 TABLET, EXTENDED RELEASE ORAL DAILY
Qty: 90 TABLET | Refills: 0 | Status: SHIPPED | OUTPATIENT
Start: 2024-10-01

## 2024-10-01 ASSESSMENT — PAIN SCALES - GENERAL: PAINLEVEL: NO PAIN (0)

## 2024-10-01 NOTE — PROGRESS NOTES
Prior to immunization administration, verified patients identity using patient s name and date of birth. Please see Immunization Activity for additional information.     Screening Questionnaire for Adult Immunization    Are you sick today?   No   Do you have allergies to medications, food, a vaccine component or latex?   No   Have you ever had a serious reaction after receiving a vaccination?   No   Do you have a long-term health problem with heart, lung, kidney, or metabolic disease (e.g., diabetes), asthma, a blood disorder, no spleen, complement component deficiency, a cochlear implant, or a spinal fluid leak?  Are you on long-term aspirin therapy?   No   Do you have cancer, leukemia, HIV/AIDS, or any other immune system problem?   No   Do you have a parent, brother, or sister with an immune system problem?   No   In the past 3 months, have you taken medications that affect  your immune system, such as prednisone, other steroids, or anticancer drugs; drugs for the treatment of rheumatoid arthritis, Crohn s disease, or psoriasis; or have you had radiation treatments?   No   Have you had a seizure, or a brain or other nervous system problem?   No   During the past year, have you received a transfusion of blood or blood    products, or been given immune (gamma) globulin or antiviral drug?   No   For women: Are you pregnant or is there a chance you could become       pregnant during the next month?   No   Have you received any vaccinations in the past 4 weeks?   No     Immunization questionnaire answers were all negative.      Patient instructed to remain in clinic for 15 minutes afterwards, and to report any adverse reactions.     Screening performed by Alessandro Guerra MA on 10/1/2024 at 11:38 AM.

## 2024-10-01 NOTE — PROGRESS NOTES
"  Assessment & Plan     Benign essential hypertension  BP Readings from Last 6 Encounters:   10/01/24 (!) 155/88   02/14/24 (!) 164/83   01/10/23 133/73   12/02/22 (!) 160/80   06/11/21 131/78   08/21/20 (!) 140/70     Blood pressure is rechecked multiple times, not at goal  Recommended continue current dose of metoprolol and amlodipine  Discontinue lisinopril 40 mg  Instead, will start on Zestoretic as mentioned below  Follow for recheck at the time of physical in 5 to 6 weeks along with labs  - Albumin Random Urine Quantitative with Creat Ratio; Future  - lisinopril-hydrochlorothiazide (ZESTORETIC) 20-12.5 MG tablet; Take 2 tablets by mouth every morning.  - amLODIPine (NORVASC) 10 MG tablet; Take 1 tablet (10 mg) by mouth daily.  - metoprolol succinate ER (TOPROL XL) 100 MG 24 hr tablet; Take 1 tablet (100 mg) by mouth daily.  -CBC with platelets  -Comprehensive metabolic panel    Hyperlipidemia LDL goal <100  Patient has not had medication and fasting labs more than a year  We will start back on Lipitor 20.  Daily, recheck labs at the next visit in 6 to 8 weeks  - atorvastatin (LIPITOR) 20 MG tablet; Take 1 tablet (20 mg) by mouth daily. Replacing pravastatin    Hypertriglyceridemia  As above  - atorvastatin (LIPITOR) 20 MG tablet; Take 1 tablet (20 mg) by mouth daily. Replacing pravastatin    Screen for colon cancer  Patient declined colonoscopy is interested in getting a Cologuard today,  - COLOGUARD([employer]); Future          BMI  Estimated body mass index is 31.68 kg/m  as calculated from the following:    Height as of this encounter: 1.702 m (5' 7\").    Weight as of this encounter: 91.8 kg (202 lb 4.8 oz).   Weight management plan: Discussed healthy diet and exercise guidelines      Work on weight loss  Regular exercise  Chart documentation done in part with Dragon Voice recognition Software. Although reviewed after completion, some word and grammatical error may remain.    See Patient " Chun Power is a 63 year old, presenting for the following health issues:  Recheck Medication, Hypertension, and Hyperlipidemia        10/1/2024    11:10 AM   Additional Questions   Roomed by Nguyen   Accompanied by self     History of Present Illness       Hyperlipidemia:  She presents for follow up of hyperlipidemia.   She is taking medication to lower cholesterol. She is not having myalgia or other side effects to statin medications.    Hypertension: She presents for follow up of hypertension.  She does not check blood pressure  regularly outside of the clinic. Outside blood pressures have been over 140/90. She does not follow a low salt diet.     She eats 0-1 servings of fruits and vegetables daily.She consumes 0 sweetened beverage(s) daily.She exercises with enough effort to increase her heart rate 9 or less minutes per day.  She exercises with enough effort to increase her heart rate 3 or less days per week. She is missing 1 dose(s) of medications per week.  She is not taking prescribed medications regularly due to remembering to take.         Hyperlipidemia Follow-Up    Are you regularly taking any medication or supplement to lower your cholesterol?   Yes- Lipitor but has not been taking for the last few months  Are you having muscle aches or other side effects that you think could be caused by your cholesterol lowering medication?  No    Hypertension Follow-up    Do you check your blood pressure regularly outside of the clinic? No   Are you following a low salt diet? No  Are your blood pressures ever more than 140 on the top number (systolic) OR more   than 90 on the bottom number (diastolic), for example 140/90? Yes        Review of Systems  CONSTITUTIONAL: NEGATIVE for fever, chills, change in weight  RESP: NEGATIVE for significant cough or SOB  CV: Hx HTN  GI: NEGATIVE for nausea, abdominal pain, heartburn, or change in bowel habits  MUSCULOSKELETAL: NEGATIVE for significant  "arthralgias or myalgia  NEURO: NEGATIVE for weakness, dizziness or paresthesias  ENDOCRINE: NEGATIVE for temperature intolerance, skin/hair changes  HEME/ALLERGY/IMMUNE: NEGATIVE for bleeding problems  PSYCHIATRIC: NEGATIVE for changes in mood or affect      Objective    BP (!) 155/88   Pulse 57   Temp 97.6  F (36.4  C) (Oral)   Resp 16   Ht 1.702 m (5' 7\")   Wt 91.8 kg (202 lb 4.8 oz)   LMP 08/26/2007   SpO2 99%   BMI 31.68 kg/m    Body mass index is 31.68 kg/m .  Physical Exam   GENERAL: alert and no distress  RESP: lungs clear to auscultation - no rales, rhonchi or wheezes  CV: regular rate and rhythm, normal S1 S2, no S3 or S4, no murmur, click or rub, no peripheral edema   MS: no gross musculoskeletal defects noted, no edema  PSYCH: mentation appears normal, affect normal/bright            Signed Electronically by: Laverne Argueta MD    "

## 2024-10-12 ENCOUNTER — HEALTH MAINTENANCE LETTER (OUTPATIENT)
Age: 64
End: 2024-10-12

## 2024-11-08 LAB — NONINV COLON CA DNA+OCC BLD SCRN STL QL: NEGATIVE

## 2024-12-04 ENCOUNTER — LAB (OUTPATIENT)
Dept: LAB | Facility: CLINIC | Age: 64
End: 2024-12-04
Payer: COMMERCIAL

## 2024-12-04 DIAGNOSIS — R73.01 ELEVATED FASTING GLUCOSE: ICD-10-CM

## 2024-12-04 DIAGNOSIS — E78.5 HYPERLIPIDEMIA LDL GOAL <100: ICD-10-CM

## 2024-12-04 DIAGNOSIS — Z13.1 SCREENING FOR DIABETES MELLITUS (DM): ICD-10-CM

## 2024-12-04 DIAGNOSIS — E78.1 HYPERTRIGLYCERIDEMIA: ICD-10-CM

## 2024-12-04 DIAGNOSIS — I10 BENIGN ESSENTIAL HYPERTENSION: ICD-10-CM

## 2024-12-04 DIAGNOSIS — R73.03 PREDIABETES: ICD-10-CM

## 2024-12-04 DIAGNOSIS — Z13.0 SCREENING FOR DEFICIENCY ANEMIA: ICD-10-CM

## 2024-12-04 LAB
ALBUMIN SERPL BCG-MCNC: 4.4 G/DL (ref 3.5–5.2)
ALP SERPL-CCNC: 70 U/L (ref 40–150)
ALT SERPL W P-5'-P-CCNC: 17 U/L (ref 0–50)
ANION GAP SERPL CALCULATED.3IONS-SCNC: 9 MMOL/L (ref 7–15)
AST SERPL W P-5'-P-CCNC: 29 U/L (ref 0–45)
BILIRUB SERPL-MCNC: 0.6 MG/DL
BUN SERPL-MCNC: 28.1 MG/DL (ref 8–23)
CALCIUM SERPL-MCNC: 9.7 MG/DL (ref 8.8–10.4)
CHLORIDE SERPL-SCNC: 105 MMOL/L (ref 98–107)
CHOLEST SERPL-MCNC: 139 MG/DL
CREAT SERPL-MCNC: 1.26 MG/DL (ref 0.51–0.95)
EGFRCR SERPLBLD CKD-EPI 2021: 47 ML/MIN/1.73M2
ERYTHROCYTE [DISTWIDTH] IN BLOOD BY AUTOMATED COUNT: 11.8 % (ref 10–15)
EST. AVERAGE GLUCOSE BLD GHB EST-MCNC: 126 MG/DL
FASTING STATUS PATIENT QL REPORTED: YES
FASTING STATUS PATIENT QL REPORTED: YES
GLUCOSE SERPL-MCNC: 112 MG/DL (ref 70–99)
HBA1C MFR BLD: 6 % (ref 0–5.6)
HCO3 SERPL-SCNC: 27 MMOL/L (ref 22–29)
HCT VFR BLD AUTO: 34.8 % (ref 35–47)
HDLC SERPL-MCNC: 43 MG/DL
HGB BLD-MCNC: 12.6 G/DL (ref 11.7–15.7)
LDLC SERPL CALC-MCNC: 78 MG/DL
MCH RBC QN AUTO: 30.4 PG (ref 26.5–33)
MCHC RBC AUTO-ENTMCNC: 36.2 G/DL (ref 31.5–36.5)
MCV RBC AUTO: 84 FL (ref 78–100)
NONHDLC SERPL-MCNC: 96 MG/DL
PLATELET # BLD AUTO: 253 10E3/UL (ref 150–450)
POTASSIUM SERPL-SCNC: 4.5 MMOL/L (ref 3.4–5.3)
PROT SERPL-MCNC: 7.4 G/DL (ref 6.4–8.3)
RBC # BLD AUTO: 4.15 10E6/UL (ref 3.8–5.2)
SODIUM SERPL-SCNC: 141 MMOL/L (ref 135–145)
TRIGL SERPL-MCNC: 88 MG/DL
WBC # BLD AUTO: 7 10E3/UL (ref 4–11)

## 2024-12-04 PROCEDURE — 83036 HEMOGLOBIN GLYCOSYLATED A1C: CPT

## 2024-12-04 PROCEDURE — 36415 COLL VENOUS BLD VENIPUNCTURE: CPT

## 2024-12-04 PROCEDURE — 80053 COMPREHEN METABOLIC PANEL: CPT

## 2024-12-04 PROCEDURE — 85027 COMPLETE CBC AUTOMATED: CPT

## 2024-12-04 PROCEDURE — 80061 LIPID PANEL: CPT

## 2024-12-09 SDOH — HEALTH STABILITY: PHYSICAL HEALTH: ON AVERAGE, HOW MANY DAYS PER WEEK DO YOU ENGAGE IN MODERATE TO STRENUOUS EXERCISE (LIKE A BRISK WALK)?: 3 DAYS

## 2024-12-09 SDOH — HEALTH STABILITY: PHYSICAL HEALTH: ON AVERAGE, HOW MANY MINUTES DO YOU ENGAGE IN EXERCISE AT THIS LEVEL?: 20 MIN

## 2024-12-09 ASSESSMENT — SOCIAL DETERMINANTS OF HEALTH (SDOH): HOW OFTEN DO YOU GET TOGETHER WITH FRIENDS OR RELATIVES?: ONCE A WEEK

## 2024-12-10 ENCOUNTER — ANCILLARY PROCEDURE (OUTPATIENT)
Dept: GENERAL RADIOLOGY | Facility: CLINIC | Age: 64
End: 2024-12-10
Attending: FAMILY MEDICINE
Payer: COMMERCIAL

## 2024-12-10 ENCOUNTER — OFFICE VISIT (OUTPATIENT)
Dept: FAMILY MEDICINE | Facility: CLINIC | Age: 64
End: 2024-12-10
Payer: COMMERCIAL

## 2024-12-10 VITALS
HEIGHT: 67 IN | BODY MASS INDEX: 31.91 KG/M2 | WEIGHT: 203.3 LBS | DIASTOLIC BLOOD PRESSURE: 79 MMHG | TEMPERATURE: 98.8 F | SYSTOLIC BLOOD PRESSURE: 138 MMHG | RESPIRATION RATE: 16 BRPM | HEART RATE: 56 BPM | OXYGEN SATURATION: 98 %

## 2024-12-10 DIAGNOSIS — E78.1 HYPERTRIGLYCERIDEMIA: ICD-10-CM

## 2024-12-10 DIAGNOSIS — G89.29 CHRONIC PAIN OF LEFT THUMB: ICD-10-CM

## 2024-12-10 DIAGNOSIS — Z12.31 ENCOUNTER FOR SCREENING MAMMOGRAM FOR MALIGNANT NEOPLASM OF BREAST: ICD-10-CM

## 2024-12-10 DIAGNOSIS — I10 BENIGN ESSENTIAL HYPERTENSION: ICD-10-CM

## 2024-12-10 DIAGNOSIS — Z78.0 MENOPAUSE: ICD-10-CM

## 2024-12-10 DIAGNOSIS — E78.5 HYPERLIPIDEMIA LDL GOAL <100: ICD-10-CM

## 2024-12-10 DIAGNOSIS — M79.645 CHRONIC PAIN OF LEFT THUMB: ICD-10-CM

## 2024-12-10 DIAGNOSIS — Z12.4 CERVICAL CANCER SCREENING: ICD-10-CM

## 2024-12-10 DIAGNOSIS — M19.042 DEGENERATIVE ARTHRITIS OF METACARPOPHALANGEAL JOINT OF LEFT THUMB: ICD-10-CM

## 2024-12-10 DIAGNOSIS — N18.31 STAGE 3A CHRONIC KIDNEY DISEASE (H): ICD-10-CM

## 2024-12-10 DIAGNOSIS — Z12.11 COLON CANCER SCREENING: ICD-10-CM

## 2024-12-10 DIAGNOSIS — Z00.00 ROUTINE GENERAL MEDICAL EXAMINATION AT A HEALTH CARE FACILITY: Primary | ICD-10-CM

## 2024-12-10 DIAGNOSIS — Z13.820 SCREENING FOR OSTEOPOROSIS: ICD-10-CM

## 2024-12-10 PROCEDURE — 99397 PER PM REEVAL EST PAT 65+ YR: CPT | Mod: 25 | Performed by: FAMILY MEDICINE

## 2024-12-10 PROCEDURE — 99214 OFFICE O/P EST MOD 30 MIN: CPT | Mod: 25 | Performed by: FAMILY MEDICINE

## 2024-12-10 PROCEDURE — 90471 IMMUNIZATION ADMIN: CPT | Performed by: FAMILY MEDICINE

## 2024-12-10 PROCEDURE — 90677 PCV20 VACCINE IM: CPT | Performed by: FAMILY MEDICINE

## 2024-12-10 PROCEDURE — 73140 X-RAY EXAM OF FINGER(S): CPT | Mod: TC | Performed by: RADIOLOGY

## 2024-12-10 RX ORDER — AMLODIPINE BESYLATE 10 MG/1
10 TABLET ORAL DAILY
Qty: 90 TABLET | Refills: 3 | Status: SHIPPED | OUTPATIENT
Start: 2024-12-10

## 2024-12-10 RX ORDER — LISINOPRIL 40 MG/1
40 TABLET ORAL EVERY MORNING
Qty: 90 TABLET | Refills: 3 | Status: SHIPPED | OUTPATIENT
Start: 2024-12-10

## 2024-12-10 RX ORDER — ATORVASTATIN CALCIUM 20 MG/1
20 TABLET, FILM COATED ORAL DAILY
Qty: 90 TABLET | Refills: 3 | Status: SHIPPED | OUTPATIENT
Start: 2024-12-10

## 2024-12-10 RX ORDER — METOPROLOL SUCCINATE 100 MG/1
100 TABLET, EXTENDED RELEASE ORAL DAILY
Qty: 90 TABLET | Refills: 3 | Status: SHIPPED | OUTPATIENT
Start: 2024-12-10

## 2024-12-10 RX ORDER — DILTIAZEM HYDROCHLORIDE EXTENDED-RELEASE TABLETS 120 MG/1
120 TABLET, EXTENDED RELEASE ORAL EVERY MORNING
Qty: 90 TABLET | Refills: 3 | Status: SHIPPED | OUTPATIENT
Start: 2024-12-10

## 2024-12-10 ASSESSMENT — PAIN SCALES - GENERAL: PAINLEVEL_OUTOF10: SEVERE PAIN (6)

## 2024-12-10 NOTE — PROGRESS NOTES
Prior to immunization administration, verified patients identity using patient s name and date of birth. Please see Immunization Activity for additional information.     Screening Questionnaire for Adult Immunization    Are you sick today?   No   Do you have allergies to medications, food, a vaccine component or latex?   No   Have you ever had a serious reaction after receiving a vaccination?   No   Do you have a long-term health problem with heart, lung, kidney, or metabolic disease (e.g., diabetes), asthma, a blood disorder, no spleen, complement component deficiency, a cochlear implant, or a spinal fluid leak?  Are you on long-term aspirin therapy?   No   Do you have cancer, leukemia, HIV/AIDS, or any other immune system problem?   No   Do you have a parent, brother, or sister with an immune system problem?   No   In the past 3 months, have you taken medications that affect  your immune system, such as prednisone, other steroids, or anticancer drugs; drugs for the treatment of rheumatoid arthritis, Crohn s disease, or psoriasis; or have you had radiation treatments?   No   Have you had a seizure, or a brain or other nervous system problem?   No   During the past year, have you received a transfusion of blood or blood    products, or been given immune (gamma) globulin or antiviral drug?   No   For women: Are you pregnant or is there a chance you could become       pregnant during the next month?   No   Have you received any vaccinations in the past 4 weeks?   No     Immunization questionnaire answers were all negative.      Patient instructed to remain in clinic for 15 minutes afterwards, and to report any adverse reactions.     Screening performed by Martha Mas MA on 12/10/2024 at 10:56 AM.

## 2024-12-10 NOTE — PATIENT INSTRUCTIONS
Patient Education   Preventive Care Advice   This is general advice given by our system to help you stay healthy. However, your care team may have specific advice just for you. Please talk to your care team about your preventive care needs.  Nutrition  Eat 5 or more servings of fruits and vegetables each day.  Try wheat bread, brown rice and whole grain pasta (instead of white bread, rice, and pasta).  Get enough calcium and vitamin D. Check the label on foods and aim for 100% of the RDA (recommended daily allowance).  Lifestyle  Exercise at least 150 minutes each week  (30 minutes a day, 5 days a week).  Do muscle strengthening activities 2 days a week. These help control your weight and prevent disease.  No smoking.  Wear sunscreen to prevent skin cancer.  Have a dental exam and cleaning every 6 months.  Yearly exams  See your health care team every year to talk about:  Any changes in your health.  Any medicines your care team has prescribed.  Preventive care, family planning, and ways to prevent chronic diseases.  Shots (vaccines)   HPV shots (up to age 26), if you've never had them before.  Hepatitis B shots (up to age 59), if you've never had them before.  COVID-19 shot: Get this shot when it's due.  Flu shot: Get a flu shot every year.  Tetanus shot: Get a tetanus shot every 10 years.  Pneumococcal, hepatitis A, and RSV shots: Ask your care team if you need these based on your risk.  Shingles shot (for age 50 and up)  General health tests  Diabetes screening:  Starting at age 35, Get screened for diabetes at least every 3 years.  If you are younger than age 35, ask your care team if you should be screened for diabetes.  Cholesterol test: At age 39, start having a cholesterol test every 5 years, or more often if advised.  Bone density scan (DEXA): At age 50, ask your care team if you should have this scan for osteoporosis (brittle bones).  Hepatitis C: Get tested at least once in your life.  STIs (sexually  transmitted infections)  Before age 24: Ask your care team if you should be screened for STIs.  After age 24: Get screened for STIs if you're at risk. You are at risk for STIs (including HIV) if:  You are sexually active with more than one person.  You don't use condoms every time.  You or a partner was diagnosed with a sexually transmitted infection.  If you are at risk for HIV, ask about PrEP medicine to prevent HIV.  Get tested for HIV at least once in your life, whether you are at risk for HIV or not.  Cancer screening tests  Cervical cancer screening: If you have a cervix, begin getting regular cervical cancer screening tests starting at age 21.  Breast cancer scan (mammogram): If you've ever had breasts, begin having regular mammograms starting at age 40. This is a scan to check for breast cancer.  Colon cancer screening: It is important to start screening for colon cancer at age 45.  Have a colonoscopy test every 10 years (or more often if you're at risk) Or, ask your provider about stool tests like a FIT test every year or Cologuard test every 3 years.  To learn more about your testing options, visit:   .  For help making a decision, visit:   https://bit.ly/io83904.  Prostate cancer screening test: If you have a prostate, ask your care team if a prostate cancer screening test (PSA) at age 55 is right for you.  Lung cancer screening: If you are a current or former smoker ages 50 to 80, ask your care team if ongoing lung cancer screenings are right for you.  For informational purposes only. Not to replace the advice of your health care provider. Copyright   2023 [facility] [employer]. All rights reserved. Clinically reviewed by the [facility] Transitions Program. [employer] [id number] - REV 01/24.  Hearing Loss: Care Instructions  Overview     Hearing loss is a sudden or slow decrease in how well you hear. It can range from slight to profound. Permanent hearing loss can occur with aging. It also can  happen when you are exposed long-term to loud noise. Examples include listening to loud music, riding motorcycles, or being around other loud machines.  Hearing loss can affect your work and home life. It can make you feel lonely or depressed. You may feel that you have lost your independence. But hearing aids and other devices can help you hear better and feel connected to others.  Follow-up care is a key part of your treatment and safety. Be sure to make and go to all appointments, and call your doctor if you are having problems. It's also a good idea to know your test results and keep a list of the medicines you take.  How can you care for yourself at home?  Avoid loud noises whenever possible. This helps keep your hearing from getting worse.  Always wear hearing protection around loud noises.  Wear a hearing aid as directed.  A professional can help you pick a hearing aid that will work best for you.  You can also get hearing aids over the counter for mild to moderate hearing loss.  Have hearing tests as your doctor suggests. They can show whether your hearing has changed. Your hearing aid may need to be adjusted.  Use other devices as needed. These may include:  Telephone amplifiers and hearing aids that can connect to a television, stereo, radio, or microphone.  Devices that use lights or vibrations. These alert you to the doorbell, a ringing telephone, or a baby monitor.  Television closed-captioning. This shows the words at the bottom of the screen. Most new TVs can do this.  TTY (text telephone). This lets you type messages back and forth on the telephone instead of talking or listening. These devices are also called TDD. When messages are typed on the keyboard, they are sent over the phone line to a receiving TTY. The message is shown on a monitor.  Use text messaging, social media, and email if it is hard for you to communicate by telephone.  Try to learn a listening technique called speechreading. It is  "not lipreading. You pay attention to people's gestures, expressions, posture, and tone of voice. These clues can help you understand what a person is saying. Face the person you are talking to, and have them face you. Make sure the lighting is good. You need to see the other person's face clearly.  Think about counseling if you need help to adjust to your hearing loss.  When should you call for help?  Watch closely for changes in your health, and be sure to contact your doctor if:    You think your hearing is getting worse.     You have new symptoms, such as dizziness or nausea.   Where can you learn more?  Go to https://www.Ass[employer].net/patiented  Enter R798 in the search box to learn more about \"Hearing Loss: Care Instructions.\"  Current as of: September 27, 2023  Content Version: 14.2 2024 [facility] [employer].   Care instructions adapted under license by your healthcare professional. If you have questions about a medical condition or this instruction, always ask your healthcare professional. [employer] disclaims any warranty or liability for your use of this information.       "

## 2024-12-10 NOTE — PROGRESS NOTES
"Preventive Care Visit  [facility]  Laverne Argueta MD, Family Medicine  Dec 10, 2024      Assessment & Plan     Routine general medical examination at a health care facility  Discussed on regular exercises, daily calcium intake, healthy eating, and routine dental checks  Biometric screening form filled per patient's request    Benign essential hypertension  BP Readings from Last 6 Encounters:   12/10/24 138/79   10/01/24 (!) 155/88   02/14/24 (!) 164/83   01/10/23 133/73   12/02/22 (!) 160/80   06/11/21 131/78     Last Comprehensive Metabolic Panel:  Lab Results   Component Value Date     12/04/2024    POTASSIUM 4.5 12/04/2024    CHLORIDE 105 12/04/2024    CO2 27 12/04/2024    ANIONGAP 9 12/04/2024     (H) 12/04/2024    BUN 28.1 (H) 12/04/2024    CR 1.26 (H) 12/04/2024    GFRESTIMATED 47 (L) 12/04/2024    RENA 9.7 12/04/2024       Lab Results   Component Value Date    MICROL <12.0 12/05/2024    MICROL 30 12/02/2022    MICROL 8 06/11/2021     No results found for: \"MICROALBUMIN\"  Initial blood pressures were high, rechecked manually at the end of the visit-138/79.  Reviewed BMP showing worsened kidney functions including elevated BUN, creatinine and low GFR  Recommended to stop taking Zestoretic continue with current dose of amlodipine, metoprolol  Start back on lisinopril 40 mg daily, add diltiazem 120 mg once daily and follow-up for blood pressure recheck with the staff in 2 weeks along with BMP recheck emphasized on good hydration, avoid NSAID use  Consider nephrology consult  for persistent or worsening concerns  Patient verbalised understanding and is agreeable to the plan.    - metoprolol succinate ER (TOPROL XL) 100 MG 24 hr tablet; Take 1 tablet (100 mg) by mouth daily.  - amLODIPine (NORVASC) 10 MG tablet; Take 1 tablet (10 mg) by mouth daily. Please fill only when due for refills  - lisinopril (ZESTRIL) 40 MG tablet; Take 1 tablet (40 mg) by mouth every morning. New " medication  - diltiazem ER (CARDIAZEM LA) 120 MG 24 hr tablet; Take 1 tablet (120 mg) by mouth every morning.  - **Basic metabolic panel FUTURE 14d; Future    Stage 3a chronic kidney disease (H)  as above    - **Basic metabolic panel FUTURE 14d; Future    Hyperlipidemia LDL goal <100  LDL Cholesterol Calculated   Date Value Ref Range Status   12/04/2024 78 <100 mg/dL Final   06/11/2021 87 <100 mg/dL Final     Comment:     Desirable:       <100 mg/dl     LDL is at goal, continue current dose of Lipitor 10 mg daily, recheck in 1 year or sooner if needed  - atorvastatin (LIPITOR) 20 MG tablet; Take 1 tablet (20 mg) by mouth daily.    Hypertriglyceridemia  As above  - atorvastatin (LIPITOR) 20 MG tablet; Take 1 tablet (20 mg) by mouth daily.    Menopause    - DX Bone Density; Future    Screening for osteoporosis    - DX Bone Density; Future    Colon cancer screening  Negative Cologuard, recheck in 2027    Encounter for screening mammogram for malignant neoplasm of breast  Reviewed negative mammogram from May 2024, recheck in 1 to 2 years    Cervical cancer screening  Patient is not due for Pap until March 2025 due to her age,.  Discontinue Pap cervical cancer screening from now on.     Chronic pain of left thumb  Comment:   Plan: XR Finger Left G/E 2 Views        Since she had a fall few months ago  Due to ongoing pain, swelling and clicking, recommended to proceed with x-ray for further evaluation  Will f/u on results and call with recommendations.  Patient verbalised understanding and is agreeable to the plan.              Counseling  Appropriate preventive services were addressed with this patient via screening, questionnaire, or discussion as appropriate for fall prevention, nutrition, physical activity, Tobacco-use cessation, social engagement, weight loss and cognition.  Checklist reviewing preventive services available has been given to the patient.  Reviewed patient's diet, addressing concerns and/or questions.    She is at risk for lack of exercise and has been provided with information to increase physical activity for the benefit of her well-being.   The patient was instructed to see the dentist every 6 months.   The patient was provided with written information regarding signs of hearing loss.       Chart documentation done in part with Dragon Voice recognition Software. Although reviewed after completion, some word and grammatical error may remain.    See Patient Instructions    Subjective   Jannet is a 64 year old, presenting for the following:  Physical        12/10/2024    10:09 AM   Additional Questions   Roomed by Gabi ISAAC   Accompanied by self          HPI      Hyperlipidemia Follow-Up    Are you regularly taking any medication or supplement to lower your cholesterol?   Yes- Lipitor  Are you having muscle aches or other side effects that you think could be caused by your cholesterol lowering medication?  No    Hypertension Follow-up    Do you check your blood pressure regularly outside of the clinic? Yes   Are you following a low salt diet? No  Are your blood pressures ever more than 140 on the top number (systolic) OR more   than 90 on the bottom number (diastolic), for example 140/90? Yes    Health Care Directive  Patient does not have a Health Care Directive: Discussed advance care planning with patient; information given to patient to review.      12/9/2024   General Health   How would you rate your overall physical health? Good   Feel stress (tense, anxious, or unable to sleep) Only a little      (!) STRESS CONCERN      12/9/2024   Nutrition   Diet: Low salt            12/9/2024   Exercise   Days per week of moderate/strenous exercise 3 days   Average minutes spent exercising at this level 20 min            12/9/2024   Social Factors   Frequency of gathering with friends or relatives Once a week   Worry food won't last until get money to buy more No   Food not last or not have enough money for food? No   Do  you have housing? (Housing is defined as stable permanent housing and does not include staying ouside in a car, in a tent, in an abandoned building, in an overnight shelter, or couch-surfing.) Yes   Are you worried about losing your housing? No   Lack of transportation? No   Unable to get utilities (heat,electricity)? No            12/9/2024   Fall Risk   Fallen 2 or more times in the past year? No    Trouble with walking or balance? No        Patient-reported          12/9/2024   Activities of Daily Living- Home Safety   Needs help with the following daily activites None of the above   Safety concerns in the home None of the above            12/9/2024   Dental   Dentist two times every year? (!) NO            12/9/2024   Hearing Screening   Hearing concerns? (!) I NEED TO ASK PEOPLE TO SPEAK UP OR REPEAT THEMSELVES.    (!) TROUBLE UNDERSTANDING SOFT OR WHISPERED SPEECH.       Multiple values from one day are sorted in reverse-chronological order         12/9/2024   Driving Risk Screening   Patient/family members have concerns about driving No            12/9/2024   General Alertness/Fatigue Screening   Have you been more tired than usual lately? No            12/9/2024   Urinary Incontinence Screening   Bothered by leaking urine in past 6 months No            12/9/2024   TB Screening   Were you born outside of the [place]? No            Today's PHQ-2 Score:       12/9/2024    11:52 AM   PHQ-2 ( 1999 [employer])   Q1: Little interest or pleasure in doing things 0    Q2: Feeling down, depressed or hopeless 0    PHQ-2 Score 0    Q1: Little interest or pleasure in doing things Not at all   Q2: Feeling down, depressed or hopeless Not at all   PHQ-2 Score 0       Patient-reported           12/9/2024   Substance Use   Alcohol more than 3/day or more than 7/wk No   Do you have a current opioid prescription? No   How severe/bad is pain from 1 to 10? 2/10   Do you use any other substances recreationally? No        Social History      Tobacco Use    Smoking status: Never    Smokeless tobacco: Never   Vaping Use    Vaping status: Never Used   Substance Use Topics    Alcohol use: Yes     Comment: ocassionally    Drug use: No           5/9/2024   LAST FHS-7 RESULTS   1st degree relative breast or ovarian cancer No   Any relative bilateral breast cancer No   Any male have breast cancer No   Any ONE woman have BOTH breast AND ovarian cancer No   Any woman with breast cancer before 50yrs No   2 or more relatives with breast AND/OR ovarian cancer No   2 or more relatives with breast AND/OR bowel cancer No           Mammogram Screening - Mammogram every 1-2 years updated in Health Maintenance based on mutual decision making      History of abnormal Pap smear: No - age 65 or older with adequate negative prior screening test results (3 consecutive negative cytology results, 2 consecutive negative cotesting results, or 2 consecutive negative HrHPV test results within 10 years, with the most recent test occurring within the recommended screening interval for the test used)        Latest Ref Rng & Units 3/2/2020     8:39 AM 3/2/2020     8:38 AM 6/2/2017     3:03 PM   PAP / HPV   PAP (Historical)   NIL     HPV 16 DNA NEG^Negative Negative   Negative    HPV 18 DNA NEG^Negative Negative   Negative    Other HR HPV NEG^Negative Negative   Negative      ASCVD Risk   The 10-year ASCVD risk score (Debby DK, et al., 2019) is: 7.9%    Values used to calculate the score:      Age: 64 years      Sex: Female      Is Non- : No      Diabetic: No      Tobacco smoker: No      Systolic Blood Pressure: 138 mmHg      Is BP treated: Yes      HDL Cholesterol: 43 mg/dL      Total Cholesterol: 139 mg/dL            Reviewed and updated as needed this visit by Provider                    Past Medical History:   Diagnosis Date    Bronchitis, not specified as acute or chronic     EXCESSIVE MENSTRUATION 8/20/2007    Mirena IUD placed for simple  hyperplasia    INSERTION OF IUD 10/3/2007    Mirena IUD  For simple hyperplasia of the endometrium    Mixed hyperlipidemia     NONSPECIFIC MEDICAL HISTORY     cough    Unspecified essential hypertension      Past Surgical History:   Procedure Laterality Date    HEAD & NECK SURGERY      [facility] NONSPECIFIC PROCEDURE      Hysteroscopy with deep endometrial biopsy. Endocervical curettage.    [facility] NONSPECIFIC PROCEDURE      brachial cleft cyst repair     OB History    Para Term  AB Living   3 2 2 0 1 2   SAB IAB Ectopic Multiple Live Births   1 0 0 0 2      # Outcome Date GA Lbr Scar/2nd Weight Sex Type Anes PTL Lv   3 Term 86 40w0d   M    RALPH      Birth Comments: high bp      Name: Mars   2 SAB 85     SAB   DEC   1 Term 82 40w0d   F    RALPH      Birth Comments: High bp      Name: Tonya     Lab work is in process  Labs reviewed in EPIC  BP Readings from Last 3 Encounters:   12/10/24 138/79   10/01/24 (!) 155/88   24 (!) 164/83    Wt Readings from Last 3 Encounters:   12/10/24 92.2 kg (203 lb 4.8 oz)   10/01/24 91.8 kg (202 lb 4.8 oz)   01/10/23 88.5 kg (195 lb)                  Patient Active Problem List   Diagnosis    Hyperlipidemia LDL goal <100    Elevated fasting glucose    Dyslipidemia    Dermatofibroma of lower extremity    Prediabetes    Hypertriglyceridemia    Stage 3a chronic kidney disease (H)    Benign essential hypertension    Plantar fasciitis    Adjustment insomnia     Past Surgical History:   Procedure Laterality Date    HEAD & NECK SURGERY      [facility] NONSPECIFIC PROCEDURE      Hysteroscopy with deep endometrial biopsy. Endocervical curettage.    [facility] NONSPECIFIC PROCEDURE      brachial cleft cyst repair       Social History     Tobacco Use    Smoking status: Never    Smokeless tobacco: Never   Substance Use Topics    Alcohol use: Yes     Comment: ocassionally     Family History   Problem Relation Age of Onset    Hypertension Mother     C.A.D. Mother          MI    C.A.D. Father         MI    Cardiovascular Father 49        MI, pacemaker in 2009    Heart Disease Father     Prostate Cancer Father     C.A.D. Brother         MI at age 41    Cardiovascular Brother     Heart Disease Brother     C.A.D. Maternal Grandfather     Diabetes Maternal Grandmother     Gastrointestinal Disease Daughter 11        Crohns Disease    Breast Cancer Paternal Grandmother     Alcohol/Drug Paternal Grandfather          Current Outpatient Medications   Medication Sig Dispense Refill    amLODIPine (NORVASC) 10 MG tablet Take 1 tablet (10 mg) by mouth daily. Please fill only when due for refills 90 tablet 3    ASPIRIN 81 MG OR TABS ONE DAILY 100 3    atorvastatin (LIPITOR) 20 MG tablet Take 1 tablet (20 mg) by mouth daily. 90 tablet 3    Calcium-Vitamin D 600-200 MG-UNIT TABS Take 1 tablet by mouth daily       diltiazem ER (CARDIAZEM LA) 120 MG 24 hr tablet Take 1 tablet (120 mg) by mouth every morning. 90 tablet 3    fish oil-omega-3 fatty acids 1000 MG capsule take 2 g by mouth daily.      lisinopril (ZESTRIL) 40 MG tablet Take 1 tablet (40 mg) by mouth every morning. New medication 90 tablet 3    metoprolol succinate ER (TOPROL XL) 100 MG 24 hr tablet Take 1 tablet (100 mg) by mouth daily. 90 tablet 3     Allergies   Allergen Reactions    No Known Drug Allergy      Recent Labs   Lab Test 12/04/24  0923 12/02/22  1023 06/11/21  0854 08/21/20  0824 03/02/20  0747 09/14/18  0958   A1C 6.0* 6.0* 6.0*   < > 6.0* 5.7*   LDL 78 106* 87   < > 112*  --    HDL 43* 44* 48*   < > 43*  --    TRIG 88 167* 62   < > 298*  --    ALT 17 34 33  --  32 27   CR 1.26* 0.75 0.98  --  0.84 1.06*   GFRESTIMATED 47* 89 62  --  76 53*   GFRESTBLACK  --   --  72  --  88 64   POTASSIUM 4.5 4.2 4.3  --  4.0 3.9   TSH  --   --   --   --  2.65 1.84    < > = values in this interval not displayed.      Current providers sharing in care for this patient include:  Patient Care Team:  Laverne Argueta MD as PCP - General  (Family Practice)  Laverne Argueta MD as Assigned PCP    The following health maintenance items are reviewed in Epic and correct as of today:  Health Maintenance   Topic Date Due    DEXA  Never done    Pneumococcal Vaccine: 65+ Years (1 of 1 - PCV) 10/22/2024    PAP FOLLOW-UP  03/02/2025    HPV FOLLOW-UP  03/02/2025    MAMMO SCREENING  05/09/2025    A1C  06/04/2025    ANNUAL REVIEW OF HM ORDERS  10/01/2025    BMP  12/04/2025    LIPID  12/04/2025    HEMOGLOBIN  12/04/2025    MICROALBUMIN  12/05/2025    MEDICARE ANNUAL WELLNESS VISIT  12/10/2025    FALL RISK ASSESSMENT  12/10/2025    DTAP/TDAP/TD IMMUNIZATION (3 - Td or Tdap) 06/02/2027    COLORECTAL CANCER SCREENING  11/01/2027    GLUCOSE  12/04/2027    ADVANCE CARE PLANNING  12/10/2029    RSV VACCINE (1 - 1-dose 75+ series) 10/22/2034    HEPATITIS C SCREENING  Completed    HIV SCREENING  Completed    PHQ-2 (once per calendar year)  Completed    ZOSTER IMMUNIZATION  Completed    COVID-19 Vaccine  Completed    HPV IMMUNIZATION  Aged Out    MENINGITIS IMMUNIZATION  Aged Out    RSV MONOCLONAL ANTIBODY  Aged Out    ALT  Discontinued    PAP  Discontinued    INFLUENZA VACCINE  Discontinued    URINALYSIS  Discontinued         Review of Systems  CONSTITUTIONAL: NEGATIVE for fever, chills, change in weight  INTEGUMENTARY/SKIN: NEGATIVE for worrisome rashes, moles or lesions  EYES: NEGATIVE for vision changes or irritation  ENT/MOUTH: NEGATIVE for ear, mouth and throat problems  RESP: NEGATIVE for significant cough or SOB  BREAST: NEGATIVE for masses, tenderness or discharge  CV: Hx HTN  GI: NEGATIVE for nausea, abdominal pain, heartburn, or change in bowel habits  : NEGATIVE for frequency, dysuria, or hematuria   female: Postmenopausal  MUSCULOSKELETAL: NEGATIVE for significant arthralgias or myalgia  Left thumb pain for the last few months she had a fall, has been having swelling and clicking since.  Patient is right-handed  NEURO: NEGATIVE for weakness, dizziness or  "paresthesias  ENDOCRINE: NEGATIVE for temperature intolerance, skin/hair changes  HEME: NEGATIVE for bleeding problems  PSYCHIATRIC: NEGATIVE for changes in mood or affect     Objective    Exam  /79   Pulse 56   Temp 98.8  F (37.1  C) (Oral)   Resp 16   Ht 1.689 m (5' 6.5\")   Wt 92.2 kg (203 lb 4.8 oz)   LMP 08/26/2007   SpO2 98%   BMI 32.32 kg/m     Estimated body mass index is 32.32 kg/m  as calculated from the following:    Height as of this encounter: 1.689 m (5' 6.5\").    Weight as of this encounter: 92.2 kg (203 lb 4.8 oz).    Physical Exam  GENERAL: alert and no distress  EYES: Eyes grossly normal to inspection, PERRL and conjunctivae and sclerae normal  HENT: ear canals and TM's normal, nose and mouth without ulcers or lesions  NECK: no adenopathy, no asymmetry, masses, or scars  RESP: lungs clear to auscultation - no rales, rhonchi or wheezes  BREAST: normal without masses, tenderness or nipple discharge and no palpable axillary masses or adenopathy  CV: regular rate and rhythm, normal S1 S2, no S3 or S4, no murmur, click or rub, no peripheral edema  ABDOMEN: soft, nontender, no hepatosplenomegaly, no masses and bowel sounds normal  MS: no gross musculoskeletal defects noted, no edema  Left thumb-IP joint swelling and minimal tenderness  SKIN: no suspicious lesions or rashes  NEURO: Normal strength and tone, mentation intact and speech normal  PSYCH: mentation appears normal, affect normal/bright        12/10/2024   Mini Cog   Clock Draw Score 2 Normal   3 Item Recall 3 objects recalled   Mini Cog Total Score 5                Signed Electronically by: Laverne Argueta MD    "

## 2024-12-11 ENCOUNTER — PATIENT OUTREACH (OUTPATIENT)
Dept: CARE COORDINATION | Facility: CLINIC | Age: 64
End: 2024-12-11
Payer: COMMERCIAL

## 2024-12-11 NOTE — RESULT ENCOUNTER NOTE
Dear Jannet,  Your thumb x-ray did not show any concern for fracture but moderate degenerative arthritis causing the swelling.  As we discussed, I would recommend to start on hand physical therapy.  I placed the referral for you, please wait to hear from the therapy staff in the next 2 to 3 days regarding the appointment  If no improvement noted in 4 to 6 weeks of hand therapy, we will consider consulting orthopedic physician for further evaluation.   Let me know if you have any questions. Take care.  Laverne Argueta MD

## 2024-12-11 NOTE — ADDENDUM NOTE
Addended by: LANEY WOODS on: 12/10/2024 12:05 PM     Modules accepted: Orders, Level of Service    
Addended by: LANEY WOODS on: 12/10/2024 12:30 PM     Modules accepted: Orders    
Addended by: LANEY WOODS on: 12/10/2024 12:31 PM     Modules accepted: Orders    
Addended by: LANEY WOODS on: 12/11/2024 09:33 AM     Modules accepted: Orders    
17-Aug-2020 09:45

## 2025-01-06 ENCOUNTER — TELEPHONE (OUTPATIENT)
Dept: FAMILY MEDICINE | Facility: CLINIC | Age: 65
End: 2025-01-06
Payer: COMMERCIAL

## 2025-01-06 NOTE — TELEPHONE ENCOUNTER
This writer attempted to contact patient on 01/06/25.    Reason for call provider's message and left message.    If patient calls back:   Please relay message below and assist with appointment scheduling.       KAIN Lara  [facility]

## 2025-01-06 NOTE — TELEPHONE ENCOUNTER
Patient returned call. RN made lab and blood pressure check appointment for 1/21/2025.  Patient will start taking two tablets every morning of diltiazem ER (CARDIAZEM LA) 120 MG 24 hr tablet.    KAIN Su  [facility]

## 2025-01-13 ENCOUNTER — TELEPHONE (OUTPATIENT)
Dept: FAMILY MEDICINE | Facility: CLINIC | Age: 65
End: 2025-01-13
Payer: COMMERCIAL

## 2025-01-13 NOTE — TELEPHONE ENCOUNTER
Pharmacy Message: The patient has been prescribed diltiazem ER (CARDIAZEM LA) 120 MG 24 hr tablet and amLODIPine (NORVASC) 10 MG tablet, which is possible duplication of therapy. Should the patient be on both medications?

## 2025-01-13 NOTE — TELEPHONE ENCOUNTER
Called [facility] and relayed message below as written. Nia verbalized understanding and stated will process refill.     KAIN Lara  [facility]

## 2025-02-04 ENCOUNTER — THERAPY VISIT (OUTPATIENT)
Dept: OCCUPATIONAL THERAPY | Facility: CLINIC | Age: 65
End: 2025-02-04
Attending: FAMILY MEDICINE
Payer: COMMERCIAL

## 2025-02-04 DIAGNOSIS — M79.645 CHRONIC PAIN OF LEFT THUMB: ICD-10-CM

## 2025-02-04 DIAGNOSIS — G89.29 CHRONIC PAIN OF LEFT THUMB: ICD-10-CM

## 2025-02-04 DIAGNOSIS — M19.042 DEGENERATIVE ARTHRITIS OF METACARPOPHALANGEAL JOINT OF LEFT THUMB: Primary | ICD-10-CM

## 2025-02-04 PROCEDURE — 97165 OT EVAL LOW COMPLEX 30 MIN: CPT | Mod: GO

## 2025-02-04 PROCEDURE — 97530 THERAPEUTIC ACTIVITIES: CPT | Mod: GO

## 2025-02-04 PROCEDURE — 97760 ORTHOTIC MGMT&TRAING 1ST ENC: CPT | Mod: GO

## 2025-02-04 NOTE — PROGRESS NOTES
"OCCUPATIONAL THERAPY EVALUATION  Type of Visit: Evaluation     Fall Risk Screen:  Fall screen completed by: OT  Have you fallen 2 or more times in the past year?: No  Have you fallen and had an injury in the past year?: No  Is patient a fall risk?: No    Subjective        Presenting condition or subjective complaint: Left thimb/hand pain  Date of onset:  (Oct 2023)    Past Medical History:   Diagnosis Date    Bronchitis, not specified as acute or chronic     EXCESSIVE MENSTRUATION 8/20/2007    Mirena IUD placed for simple hyperplasia    INSERTION OF IUD 10/3/2007    Mirena IUD  For simple hyperplasia of the endometrium    Mixed hyperlipidemia     NONSPECIFIC MEDICAL HISTORY     cough    Unspecified essential hypertension      Past Surgical History:   Procedure Laterality Date    HEAD & NECK SURGERY      [facility] NONSPECIFIC PROCEDURE  2000    Hysteroscopy with deep endometrial biopsy. Endocervical curettage.    [facility] NONSPECIFIC PROCEDURE  1984    brachial cleft cyst repair     Prior diagnostic imaging/testing results: X-ray     Prior therapy history for the same diagnosis, illness or injury: No      Prior Level of Function  Transfers: Independent  Ambulation: Independent  ADL: Independent  IADL:  Independent    Living Environment  Social support: With a significant other or spouse   Type of home: 2-story   Stairs to enter the home: Yes 2   Help at home: None    Employment: No    Hobbies/Interests: Crafting, [employer]z    Patient goals for therapy: Open jars, clap, move thumb    Pain assessment: Pain present  See objective evaluation for additional pain details     Objective   ADDITIONAL HISTORY:  Right hand dominant  Patient reports symptoms of pain, stiffness/loss of motion, weakness/loss of strength, and \"catching\"  Transportation: drives    Functional Outcome Measure:   Upper Extremity Functional Index Score:  SCORE:   Column Totals: /80: (Patient-Rptd) 54   (A lower score indicates greater " disability.)    PAIN:  Pain Level at Rest: 3/10  Pain Level with Use: 8/10  Pain Location: L CMC joint, thenar eminence  Pain Quality: Aching, Dull, Sharp, and Shooting  Pain Frequency: constant  Pain is Worst: daytime  Pain is Exacerbated By: clapping, holding a steering wheel, opening jars/undoing a bra  Pain is Relieved By: none  Pain Progression: Worsened    EDEMA: None     ROM:   Thumb ROM  Left AROM Right AROM    MP Joint /54 /57   IP Joint  /48 /68   Radial Abduction 40+ 45   Palmar Abduction 40+ 45   Kapandji Opposition Scale (0-10/10) 10 10     OBSERVATIONS/APPEARANCE:   Thumb Left   Shoulder deformity present at CMC joint ++   Edema over CMC joint -   Noted collapse of MP Joint into hyperextension during pinch +      SPECIAL TESTS:   CMC OA Provocative Tests  Pain Report Left   Thumb Adduction Stress Test ++   C Thumb Extension Stress Test +++   Finkelstein's Test -   WHAT Test ++     STRENGTH:     Measured in pounds 2/4/2025 2/4/2025    Left Right   Trial 1 40+ 57     Lateral Pinch  Measured in pounds 2/4/2025 2/4/2025    Left Right   Trial 1 12+ 13     3 Point Pinch  Measured in pounds 2/4/2025 2/4/2025    Left Right   Trial 1 10+ 14     PALPATION:   Thumb Palpation    Thumb CMC Joint 7/10   Thenar Glenn 0/10   Web Space 4/10   1st Dorsal Compartment 0/10   Radial Styloid 5/10   FCR Insertion 0/10     Assessment & Plan   CLINICAL IMPRESSIONS  Medical Diagnosis: 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  The following significant findings have been identified: Impaired ROM, Impaired strength, and Pain.  These identified deficits interfere with their ability to perform self care tasks, recreational activities, household chores, driving ,  yard work, and meal  planning and preparation as compared to previous level of function.   Patient's limitations or Problem List includes: Pain, Decreased ROM/motion, Weakness, Decreased stability, Decreased , Decreased pinch, Decreased coordination, Decreased dexterity, Tightness in musculature, and Adherence in connective tissue of the left thumb which interferes with the patient's ability to perform Self Care Tasks (dressing, hygiene/toileting), Sleep Patterns, Recreational Activities, Household Chores, and Driving  as compared to previous level of function.    Clinical Decision Making (Complexity):  Assessment of Occupational Performance: 5 or more Performance Deficits  Occupational Performance Limitations: dressing, hygiene and grooming, driving and community mobility, health management and maintenance, home establishment and management, meal preparation and cleanup, sleep, and leisure activities  Clinical Decision Making (Complexity): Low complexity    PLAN OF CARE  Treatment Interventions:  Modalities:  US and Paraffin  Therapeutic Exercise:  AROM, AAROM, PROM, Tendon Gliding, Blocking, Reverse Blocking, Place and Hold, Contract Relax, Extensor Tracking, Isotonics, Isometrics, and Stabilization  Neuromuscular re-education:  Kinesthetic Training, Proprioceptive Training, Posture, Kinesiotaping, Isometrics, and Stabilization  Manual Techniques:  Coordination/Dexterity, Joint mobilization, Friction massage, Myofascial release, and Manual edema mobilization  Orthotic Fabrication:  Static, Hand based, and Forearm based  Self Care:  Self Care Tasks and Ergonomic Considerations    Long Term Goals   OT Goal 1  Goal Identifier: Overhead Reaching  Goal Description: Pt will report no difficulty with gripping and reaching overhead in order to put dishes away in a cabinet  Rationale: In order to maximize safety and independence with ADL/IADLs  Goal Progress: Moderate difficulty  Target Date: 05/04/25      Frequency of Treatment: 2x  monthly  Duration of Treatment: 3 months     Education Assessment: Learner/Method: Patient;Demonstration;Pictures/Video  Education Comments: PTRX via printout     Risks and benefits of evaluation/treatment have been explained.   Patient/Family/caregiver agrees with Plan of Care.     Evaluation Time:    OT Eval, Low Complexity Minutes (14310): 15  Signing Clinician: Jolly Burnham OT

## 2025-02-05 ENCOUNTER — LAB (OUTPATIENT)
Dept: LAB | Facility: CLINIC | Age: 65
End: 2025-02-05
Payer: COMMERCIAL

## 2025-02-05 ENCOUNTER — ALLIED HEALTH/NURSE VISIT (OUTPATIENT)
Dept: FAMILY MEDICINE | Facility: CLINIC | Age: 65
End: 2025-02-05
Payer: COMMERCIAL

## 2025-02-05 VITALS
RESPIRATION RATE: 15 BRPM | SYSTOLIC BLOOD PRESSURE: 131 MMHG | OXYGEN SATURATION: 97 % | DIASTOLIC BLOOD PRESSURE: 81 MMHG | TEMPERATURE: 97 F | HEART RATE: 50 BPM

## 2025-02-05 DIAGNOSIS — Z01.30 BP CHECK: Primary | ICD-10-CM

## 2025-02-05 DIAGNOSIS — I10 BENIGN ESSENTIAL HYPERTENSION: ICD-10-CM

## 2025-02-05 LAB
ALBUMIN SERPL BCG-MCNC: 4.3 G/DL (ref 3.5–5.2)
ANION GAP SERPL CALCULATED.3IONS-SCNC: 10 MMOL/L (ref 7–15)
BUN SERPL-MCNC: 19.2 MG/DL (ref 8–23)
CALCIUM SERPL-MCNC: 9.6 MG/DL (ref 8.8–10.4)
CHLORIDE SERPL-SCNC: 105 MMOL/L (ref 98–107)
CREAT SERPL-MCNC: 0.93 MG/DL (ref 0.51–0.95)
EGFRCR SERPLBLD CKD-EPI 2021: 68 ML/MIN/1.73M2
GLUCOSE SERPL-MCNC: 96 MG/DL (ref 70–99)
HCO3 SERPL-SCNC: 26 MMOL/L (ref 22–29)
PHOSPHATE SERPL-MCNC: 4.1 MG/DL (ref 2.5–4.5)
POTASSIUM SERPL-SCNC: 4.5 MMOL/L (ref 3.4–5.3)
SODIUM SERPL-SCNC: 141 MMOL/L (ref 135–145)

## 2025-02-05 PROCEDURE — 36415 COLL VENOUS BLD VENIPUNCTURE: CPT

## 2025-02-05 PROCEDURE — 80069 RENAL FUNCTION PANEL: CPT

## 2025-02-05 PROCEDURE — 99207 PR NO CHARGE NURSE ONLY: CPT

## 2025-02-05 ASSESSMENT — PAIN SCALES - GENERAL: PAINLEVEL_OUTOF10: NO PAIN (0)

## 2025-02-05 NOTE — Clinical Note
Jannet Love is a 64 year old patient who comes in today for a Blood Pressure check. Initial BP:  BP (!) 163/75 (BP Location: Right arm, Patient Position: Sitting, Cuff Size: Adult Regular)   Pulse 50   Temp 97  F (36.1  C) (Temporal)   Resp 15   LMP 08/26/2007   SpO2 97%     50 Disposition: results routed to provider Second BP: 131/81  Alessandro Guerra MA

## 2025-02-05 NOTE — RESULT ENCOUNTER NOTE
Dear Jannet,  Your recent labs showed normalized kidney functions, this is good and reassuring.  Your blood pressure is at goal, let us continue with current medications with no change.   Let me know if you have any questions. Take care.  Laverne Argueta MD

## 2025-02-18 ENCOUNTER — THERAPY VISIT (OUTPATIENT)
Dept: OCCUPATIONAL THERAPY | Facility: CLINIC | Age: 65
End: 2025-02-18
Payer: COMMERCIAL

## 2025-02-18 DIAGNOSIS — G89.29 CHRONIC PAIN OF LEFT THUMB: ICD-10-CM

## 2025-02-18 DIAGNOSIS — M79.645 CHRONIC PAIN OF LEFT THUMB: ICD-10-CM

## 2025-02-18 DIAGNOSIS — M19.042 DEGENERATIVE ARTHRITIS OF METACARPOPHALANGEAL JOINT OF LEFT THUMB: Primary | ICD-10-CM

## 2025-02-18 PROCEDURE — 97035 APP MDLTY 1+ULTRASOUND EA 15: CPT | Mod: GO

## 2025-02-18 PROCEDURE — 97110 THERAPEUTIC EXERCISES: CPT | Mod: GO

## 2025-02-18 PROCEDURE — 97140 MANUAL THERAPY 1/> REGIONS: CPT | Mod: GO

## 2025-03-04 ENCOUNTER — THERAPY VISIT (OUTPATIENT)
Dept: OCCUPATIONAL THERAPY | Facility: CLINIC | Age: 65
End: 2025-03-04
Payer: COMMERCIAL

## 2025-03-04 DIAGNOSIS — M19.042 DEGENERATIVE ARTHRITIS OF METACARPOPHALANGEAL JOINT OF LEFT THUMB: Primary | ICD-10-CM

## 2025-03-04 DIAGNOSIS — G89.29 CHRONIC PAIN OF LEFT THUMB: ICD-10-CM

## 2025-03-04 DIAGNOSIS — M79.645 CHRONIC PAIN OF LEFT THUMB: ICD-10-CM

## 2025-03-04 PROCEDURE — 97018 PARAFFIN BATH THERAPY: CPT | Mod: GO

## 2025-03-04 PROCEDURE — 97110 THERAPEUTIC EXERCISES: CPT | Mod: GO

## 2025-03-04 PROCEDURE — 97140 MANUAL THERAPY 1/> REGIONS: CPT | Mod: GO

## 2025-03-18 ENCOUNTER — THERAPY VISIT (OUTPATIENT)
Dept: OCCUPATIONAL THERAPY | Facility: CLINIC | Age: 65
End: 2025-03-18
Payer: COMMERCIAL

## 2025-03-18 DIAGNOSIS — M79.645 CHRONIC PAIN OF LEFT THUMB: ICD-10-CM

## 2025-03-18 DIAGNOSIS — G89.29 CHRONIC PAIN OF LEFT THUMB: ICD-10-CM

## 2025-03-18 DIAGNOSIS — M19.042 DEGENERATIVE ARTHRITIS OF METACARPOPHALANGEAL JOINT OF LEFT THUMB: Primary | ICD-10-CM

## 2025-03-18 PROCEDURE — 97018 PARAFFIN BATH THERAPY: CPT | Mod: GO

## 2025-03-18 PROCEDURE — 97110 THERAPEUTIC EXERCISES: CPT | Mod: GO

## 2025-03-18 PROCEDURE — 97140 MANUAL THERAPY 1/> REGIONS: CPT | Mod: GO

## 2025-03-18 NOTE — PROGRESS NOTES
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   OT Tx Diagnosis Left thumb pain   Progress Note/Certification   Onset of Illness/Injury or Date of Surgery   (Oct 2023)   Therapy Frequency 2x monthly   Predicted Duration 3 months   Progress Note Due Date 03/18/25   Progress Note Completed Date 03/18/25   Goals   OT Goals 1   OT Goal 1   Goal Identifier Overhead Reaching   Goal Description Pt will report no difficulty with gripping and reaching overhead in order to put dishes away in a cabinet   Rationale In order to maximize safety and independence with ADL/IADLs   Goal Progress Mild difficulty   Target Date 05/04/25   Subjective Report   Subjective Report \"Good, actually. I mean, every once in awhile, I feel it in here (CMC area). I sleep through the night. I get the exercises in occasionally. My daughter had surgery and we've been helping her out.\"   Objective Measures   Objective Measures Objective Measure 1;Hand Obj Measures   Hand Objective Measures ROM;Special Tests;Strength   ROM Thumb ROM   Special Tests CMC OA Provacative Tests   Strength ;Lateral Pinch;3 Point Pinch   Objective Measure 1   Objective Measure Pain Scale   Details 0-3/10   Thumb ROM    MP Joint /52   IP Joint  /63   Radial Abduction 42+   Palmar Abduction 42+   Kapandji Opposition Scale (0-10/10) 10   CMC OA Provocative Tests   Thumb Adduction Stress Test +   Thumb Extension Stress Test ++   WHAT Test ++    (measured in pounds)    Average Strength 52+   Lateral Pinch (measured in pounds)   Lateral Pinch Average Strength 13+   3 Point Pinch (measured in pounds)   3 Point Pinch Average Strength 13+   OT Modalities   OT Modalities Paraffin Bath    Paraffin Bath   Paraffin Minutes (24077) 15 Minutes   Paraffin - Duration 10 min   Location Left hand   Set Up 7 dips "   Patient Response/Progress Tolerates well   Treatment Interventions (OT)   Interventions Therapeutic Procedure/Exercise;Manual Therapy;Orthotics   Therapeutic Procedure/Exercise   Therapeutic Procedure: strength, endurance, ROM, flexibillity minutes (03399) 10   PTRx Ther Proc 1 Thumb Stabilization Strengthening Isometric C   PTRx Ther Proc 1 - Details HEP    PTRx Ther Proc 2 Thumb Stabilization 1st Dorsal Interosseous without Support   PTRx Ther Proc 2 - Details HEP    PTRx Ther Proc 3 Thumb Stabilization Strengthening Isometric 1st Dorsal Interosseous   PTRx Ther Proc 3 - Details 3-4x daily 10 (hold for 5 seconds) reviewed HEP    Skilled Intervention To track therapy progress.   Patient Response/Progress Pt is demo'ing progress with pain management/reduction, ROM, and strength. They are ready to discharge from hand therapy and independently manage symptoms and HEP. Pt will return, as needed, within the next 3 months.   Ther Proc 1 Discharge Note   Ther Proc 1 - Details Please refer to the objective data gathered for today's discharge note.   Therapeutic Activity   PTRx Ther Act 1 Arthritis Tips/Joint Protection   PTRx Ther Act 1 - Details HEP    PTRx Ther Act 2 Orthosis Wear and Care   PTRx Ther Act 2 - Details HEP    PTRx Ther Act 3 Warmth   PTRx Ther Act 3 - Details HEP    PTRx Ther Act 4 [place] Massage to Thumb   PTRx Ther Act 4 - Details HEP    PTRx Ther Act 5 Thumb Stabilization Web Space Release Method 1 with Clip   PTRx Ther Act 5 - Details HEP    Manual Therapy   Manual Therapy Minutes (24064) 10   Manual Therapy 1 STM to thenars and webspace   Manual Therapy 1 - Details Moderate to maximum pressure with paraffin heat   Skilled Intervention To increase blood flow and improve tissue mobility for pain reduction & improve range of motion.   Patient Response/Progress Tolerates well   Orthotics   Comments Cont. wear   Education   Learner/Method Patient;Demonstration;Pictures/Video   Education Comments PTRX via  printout   Plan   Home program See PTRX   Plan for next session Prepare for discharge, progress HEP, paraffin vs US, STM   Total Session Time   Timed Code Treatment Minutes 20   Total Treatment Time (sum of timed and untimed services) 35     Upper Extremity Functional Index Score:  SCORE:   Column Totals: /80: 70   (A lower score indicates greater disability.)      DISCHARGE  Reason for Discharge: Patient chooses to discontinue therapy.  Patient no longer requires therapy to meet their goals.     Discharge Plan: Patient to continue home program.    Referring Provider:  Laverne Argueta

## 2025-06-14 ENCOUNTER — HEALTH MAINTENANCE LETTER (OUTPATIENT)
Age: 65
End: 2025-06-14

## 2025-07-26 ENCOUNTER — HEALTH MAINTENANCE LETTER (OUTPATIENT)
Age: 65
End: 2025-07-26